# Patient Record
Sex: MALE | Race: OTHER | HISPANIC OR LATINO | URBAN - METROPOLITAN AREA
[De-identification: names, ages, dates, MRNs, and addresses within clinical notes are randomized per-mention and may not be internally consistent; named-entity substitution may affect disease eponyms.]

---

## 2017-03-09 ENCOUNTER — EMERGENCY (EMERGENCY)
Facility: HOSPITAL | Age: 47
LOS: 1 days | Discharge: HOME | End: 2017-03-09
Attending: EMERGENCY MEDICINE | Admitting: EMERGENCY MEDICINE
Payer: MEDICAID

## 2017-03-09 VITALS
TEMPERATURE: 99 F | DIASTOLIC BLOOD PRESSURE: 91 MMHG | HEART RATE: 106 BPM | OXYGEN SATURATION: 100 % | SYSTOLIC BLOOD PRESSURE: 148 MMHG | RESPIRATION RATE: 16 BRPM

## 2017-03-09 DIAGNOSIS — F10.120 ALCOHOL ABUSE WITH INTOXICATION, UNCOMPLICATED: ICD-10-CM

## 2017-03-09 DIAGNOSIS — R41.82 ALTERED MENTAL STATUS, UNSPECIFIED: ICD-10-CM

## 2017-03-09 PROCEDURE — 99283 EMERGENCY DEPT VISIT LOW MDM: CPT

## 2017-03-09 NOTE — ED PROVIDER NOTE - PROGRESS NOTE DETAILS
Patient AAOx3, clear speech, steady gait, appropriate for discharge, counseled extensively on alcohol use and abuse, does not want detox at this time

## 2017-03-09 NOTE — ED PROVIDER NOTE - OBJECTIVE STATEMENT
45 yo M BIB EMS for ETOH intoxication, admits to ETOH today, no other complaints, unable to cooperate with remainder of history 47 yo M BIB EMS for ETOH intoxication, admits to ETOH today, no other complaints, unable to cooperate with remainder of history

## 2017-03-09 NOTE — ED PROVIDER NOTE - NS ED MD SCRIBE ATTENDING SCRIBE SECTIONS
HISTORY OF PRESENT ILLNESS/REVIEW OF SYSTEMS/PHYSICAL EXAM/DISPOSITION/PAST MEDICAL/SURGICAL/SOCIAL HISTORY/OBSERVATION MONITORING PLAN

## 2017-03-09 NOTE — ED PROVIDER NOTE - PHYSICAL EXAMINATION
General: lethargic, arousable to touch, smells of alcohol  Head: NCAT  Eyes: PERRL  Heart: RRR  Lungs: CTAB  Abd: soft, NTND  Neuro: moves all 4 extemities equally

## 2017-03-09 NOTE — ED PROVIDER NOTE - MEDICAL DECISION MAKING DETAILS
Alcohol intoxication, no signs of trauma, not hypoglycemic, neuro exam non-focal, will observe and reassess

## 2017-06-26 ENCOUNTER — EMERGENCY (EMERGENCY)
Facility: HOSPITAL | Age: 47
LOS: 1 days | Discharge: HOME | End: 2017-06-26
Attending: EMERGENCY MEDICINE | Admitting: EMERGENCY MEDICINE
Payer: SELF-PAY

## 2017-06-26 VITALS
TEMPERATURE: 99 F | OXYGEN SATURATION: 98 % | DIASTOLIC BLOOD PRESSURE: 73 MMHG | SYSTOLIC BLOOD PRESSURE: 120 MMHG | RESPIRATION RATE: 16 BRPM | HEART RATE: 89 BPM

## 2017-06-26 DIAGNOSIS — F10.129 ALCOHOL ABUSE WITH INTOXICATION, UNSPECIFIED: ICD-10-CM

## 2017-06-26 DIAGNOSIS — R41.82 ALTERED MENTAL STATUS, UNSPECIFIED: ICD-10-CM

## 2017-06-26 PROCEDURE — 70450 CT HEAD/BRAIN W/O DYE: CPT | Mod: 26

## 2017-06-26 PROCEDURE — 99284 EMERGENCY DEPT VISIT MOD MDM: CPT

## 2017-06-26 RX ORDER — SODIUM CHLORIDE 9 MG/ML
3 INJECTION INTRAMUSCULAR; INTRAVENOUS; SUBCUTANEOUS ONCE
Refills: 0 | Status: DISCONTINUED | OUTPATIENT
Start: 2017-06-26 | End: 2017-06-30

## 2017-06-26 RX ORDER — SODIUM CHLORIDE 9 MG/ML
3 INJECTION INTRAMUSCULAR; INTRAVENOUS; SUBCUTANEOUS ONCE
Refills: 0 | Status: DISCONTINUED | OUTPATIENT
Start: 2017-06-26 | End: 2017-06-26

## 2017-06-26 NOTE — ED PROVIDER NOTE - CONSTITUTIONAL, MLM
normal... Dressed in female clothes. Opens eyes to tactile stimuli, but nonverbal. + ETOH on breath.

## 2017-06-26 NOTE — ED PROVIDER NOTE - PROGRESS NOTE DETAILS
Pt walked out of the ED before CT scan was ready, although ED providers did a wet read with no significant results. Pt refused to leave a telephone number with the ED before leaving.

## 2017-06-26 NOTE — ED PROVIDER NOTE - OBJECTIVE STATEMENT
Pt is a 45 y/o  male to female transgender BIB EMS for AMS. Pt was found unresponsive on the street with a beer can next to her. EMS states the pt was responsive to verbal stimuli Pt is a 47 y/o  male to female transgender BIB EMS for AMS. Pt was found unresponsive on the street with a beer can next to her. EMS states the pt was responsive to verbal stimuli Pt is a 45 y/o  male to female transgender BIB EMS for AMS. Pt was found unresponsive on the street with a beer can next to her. EMS states the pt was responsive to verbal stimuli, currently pt is unresponsive. Limited hx due to unresponsiveness. Pt is a 47 y/o  male to female transgender BIB EMS for AMS. Pt was found unresponsive on the street with a beer can next to her. EMS states the pt was responsive to verbal stimuli, currently pt is unresponsive. Limited hx due to unresponsiveness.

## 2017-06-26 NOTE — ED ADULT NURSE NOTE - OBJECTIVE STATEMENT
Patient is a 45 y/o male BIBA for AMS after intox/substance use. Patient is currently A,A&Ox3 at 12:00pm, walking with steady gait without assistance, and in NAD. Patient states she wants to leave. Patient's skin is intact, neuro intact, +ROM. Will continue to monitor. Patient is a 47 y/o male BIBA for AMS after intox/substance use. Patient is currently A,A&Ox3 at 12:00pm, walking with steady gait without assistance, and in NAD. Patient states she wants to leave. Patient's skin is intact, neuro intact, +ROM. Will continue to monitor.

## 2017-06-26 NOTE — ED PROVIDER NOTE - MEDICAL DECISION MAKING DETAILS
47 y/o M to F, transgender pt presents to ED minimally responsive in the setting of alcohol intoxication.  No overt signs of trauma.  Head CT done.  Pt shortly became agitated and walked out of ED.  CT head read by radiology after discharge- negative. 45 y/o M to F, transgender pt presents to ED minimally responsive in the setting of alcohol intoxication.  No overt signs of trauma.  Head CT done.  Pt shortly became agitated and walked out of ED.  CT head read by radiology after discharge- negative.

## 2017-06-26 NOTE — ED PROVIDER NOTE - ATTENDING CONTRIBUTION TO CARE
BIBEMS for AMS.  wandering around the department making inappropriate sounds and gestures.  CT brain without acute findings.  Discharged from the ED.  Symptoms likely related to substance use - found with beer next to patient in the street.  Safe discharge planning discussed.

## 2017-06-30 ENCOUNTER — EMERGENCY (EMERGENCY)
Facility: HOSPITAL | Age: 47
LOS: 1 days | Discharge: HOME | End: 2017-06-30
Admitting: EMERGENCY MEDICINE
Payer: SELF-PAY

## 2017-06-30 VITALS
TEMPERATURE: 98 F | RESPIRATION RATE: 17 BRPM | DIASTOLIC BLOOD PRESSURE: 82 MMHG | HEART RATE: 90 BPM | OXYGEN SATURATION: 98 % | SYSTOLIC BLOOD PRESSURE: 120 MMHG

## 2017-06-30 DIAGNOSIS — F10.129 ALCOHOL ABUSE WITH INTOXICATION, UNSPECIFIED: ICD-10-CM

## 2017-06-30 DIAGNOSIS — R41.82 ALTERED MENTAL STATUS, UNSPECIFIED: ICD-10-CM

## 2017-06-30 PROCEDURE — 99283 EMERGENCY DEPT VISIT LOW MDM: CPT | Mod: 25

## 2017-06-30 NOTE — ED ADULT NURSE REASSESSMENT NOTE - NS ED NURSE REASSESS COMMENT FT1
patient woke up wailing asking for blanket, provided one. patient woke up wailing asking for blanket, provided one. Pt urinated on self, and been shouting. ED provider at bedside.

## 2017-06-30 NOTE — ED PROVIDER NOTE - OBJECTIVE STATEMENT
45 y/o transgender M to F BIBA for alcohol intoxication after being found intoxicated in public. Pt endorses drinking ETOH tonight and is belligerent and uncooperative with staff. Will not quantify the amount of ETOH ingested. Remainder of Hx limited 2/2 lack of pt cooperation. 47 y/o transgender M to F BIBA for alcohol intoxication after being found intoxicated in public. Pt endorses drinking ETOH tonight and is belligerent and uncooperative with staff. Will not quantify the amount of ETOH ingested. Remainder of Hx limited 2/2 lack of pt cooperation.

## 2017-06-30 NOTE — ED PROVIDER NOTE - ATTENDING CONTRIBUTION TO CARE
Patient seen and examined by me.  Agree with H&P as documented by PA. Pt brought in by EMS after being intoxicated in public.  Pt shouting and belligerent.  Clear speech and steady gait.  Discharged.

## 2017-06-30 NOTE — ED ADULT NURSE NOTE - CHIEF COMPLAINT QUOTE
patient brought in by ambulance from the street after found laying on the side walk. + ETOH breath, unknown if + drugs. + urine incontinence

## 2017-06-30 NOTE — ED ADULT TRIAGE NOTE - CHIEF COMPLAINT QUOTE
patient brought in by ambulance from the street after found laying on the side walk. + ETOH breath, unknown if + drugs

## 2019-01-25 ENCOUNTER — EMERGENCY (EMERGENCY)
Facility: HOSPITAL | Age: 49
LOS: 1 days | Discharge: HOME | End: 2019-01-25
Attending: EMERGENCY MEDICINE | Admitting: EMERGENCY MEDICINE
Payer: COMMERCIAL

## 2019-01-25 VITALS
OXYGEN SATURATION: 96 % | DIASTOLIC BLOOD PRESSURE: 79 MMHG | HEART RATE: 110 BPM | SYSTOLIC BLOOD PRESSURE: 131 MMHG | TEMPERATURE: 98 F | RESPIRATION RATE: 18 BRPM

## 2019-01-25 VITALS
DIASTOLIC BLOOD PRESSURE: 78 MMHG | OXYGEN SATURATION: 98 % | RESPIRATION RATE: 16 BRPM | TEMPERATURE: 98 F | SYSTOLIC BLOOD PRESSURE: 100 MMHG | HEART RATE: 94 BPM

## 2019-01-25 DIAGNOSIS — Z00.8 ENCOUNTER FOR OTHER GENERAL EXAMINATION: ICD-10-CM

## 2019-01-25 DIAGNOSIS — R10.9 UNSPECIFIED ABDOMINAL PAIN: ICD-10-CM

## 2019-01-25 PROCEDURE — 99282 EMERGENCY DEPT VISIT SF MDM: CPT | Mod: 25

## 2019-01-25 PROCEDURE — 99053 MED SERV 10PM-8AM 24 HR FAC: CPT

## 2019-01-25 RX ORDER — SODIUM CHLORIDE 9 MG/ML
1000 INJECTION INTRAMUSCULAR; INTRAVENOUS; SUBCUTANEOUS ONCE
Refills: 0 | Status: DISCONTINUED | OUTPATIENT
Start: 2019-01-25 | End: 2019-01-25

## 2019-01-25 RX ORDER — FAMOTIDINE 10 MG/ML
20 INJECTION INTRAVENOUS ONCE
Refills: 0 | Status: DISCONTINUED | OUTPATIENT
Start: 2019-01-25 | End: 2019-01-25

## 2019-01-25 NOTE — ED PROVIDER NOTE - OBJECTIVE STATEMENT
48 yom pw as EDP, was in a restaruant, had 2 margaritas, refused to leave.  pt initially reported abd pain w/ vomiting, upon my evaluation, feels well but tired, admits to EtOH use, denies pain, denies nausea.

## 2019-01-25 NOTE — ED PROVIDER NOTE - PHYSICAL EXAMINATION
CON: ao x 3, HENMT: clear oropharynx, soft neck, HEAD: atraumatic, CV: rrr, equal pulses b/l, RESP: cta b/l, GI: +BS, soft, nontender, no rebound, no guarding, SKIN: no rash, MSK: no deformities, NEURO: ao x 3, clear speech, follows commands, steady gait

## 2019-01-25 NOTE — ED PROVIDER NOTE - NSFOLLOWUPINSTRUCTIONS_ED_ALL_ED_FT
Follow up with your primary care doctors  Return immediately for any new or worsening symptoms or any new concerns

## 2019-01-25 NOTE — ED ADULT NURSE NOTE - NSIMPLEMENTINTERV_GEN_ALL_ED
Implemented All Universal Safety Interventions:  [place] to call system. Call bell, personal items and telephone within reach. Instruct patient to call for assistance. Room bathroom lighting operational. Non-slip footwear when patient is off stretcher. Physically safe environment: no spills, clutter or unnecessary equipment. Stretcher in lowest position, wheels locked, appropriate side rails in place. Implemented All Universal Safety Interventions:  [place] to call system. Call bell, personal items and telephone within reach. Instruct patient to call for assistance. Room bathroom lighting operational. Non-slip footwear when patient is off stretcher. Physically safe environment: no spills, clutter or unnecessary equipment. Stretcher in lowest position, wheels locked, appropriate side rails in place. Implemented All Universal Safety Interventions:  [place] to call system. Call bell, personal items and telephone within reach. Instruct patient to call for assistance. Room bathroom lighting operational. Non-slip footwear when patient is off stretcher. Physically safe environment: no spills, clutter or unnecessary equipment. Stretcher in lowest position, wheels locked, appropriate side rails in place.

## 2019-01-25 NOTE — ED ADULT TRIAGE NOTE - CHIEF COMPLAINT QUOTE
abdominal pain with vomiting this morning, no fevers, no diarrhea; admits to drinking 2 margaritas PTA

## 2019-01-25 NOTE — ED PROVIDER NOTE - MEDICAL DECISION MAKING DETAILS
denies pain, vomiting resolved, denies being nauseous, will rpt vs, pt declines any lab/iv, nontender abd, clinically not c/w acute intraabd surg pathology, will reassess

## 2019-01-25 NOTE — ED ADULT NURSE REASSESSMENT NOTE - NS ED NURSE REASSESS COMMENT FT1
report received from off-going RN, pt. resting quietly, nad, spoke with Dr. Andrade regarding orders, states do not implement, allow pt. to rest, and will d/c when sober, assessment on-going

## 2019-01-25 NOTE — ED ADULT NURSE NOTE - OBJECTIVE STATEMENT
as per triage note, initially c/o abd. pain, no complaint at present, states drank 2 glasses of wine

## 2019-03-23 ENCOUNTER — EMERGENCY (EMERGENCY)
Facility: HOSPITAL | Age: 49
LOS: 1 days | Discharge: HOME | End: 2019-03-23
Attending: EMERGENCY MEDICINE | Admitting: EMERGENCY MEDICINE
Payer: MEDICAID

## 2019-03-23 VITALS
TEMPERATURE: 98 F | DIASTOLIC BLOOD PRESSURE: 93 MMHG | SYSTOLIC BLOOD PRESSURE: 136 MMHG | RESPIRATION RATE: 18 BRPM | HEART RATE: 103 BPM | OXYGEN SATURATION: 97 %

## 2019-03-23 VITALS
RESPIRATION RATE: 16 BRPM | TEMPERATURE: 99 F | HEART RATE: 89 BPM | DIASTOLIC BLOOD PRESSURE: 86 MMHG | OXYGEN SATURATION: 98 % | SYSTOLIC BLOOD PRESSURE: 128 MMHG

## 2019-03-23 PROCEDURE — 99283 EMERGENCY DEPT VISIT LOW MDM: CPT | Mod: 25

## 2019-03-23 PROCEDURE — 71046 X-RAY EXAM CHEST 2 VIEWS: CPT | Mod: 26

## 2019-03-23 RX ORDER — SPIRONOLACTONE 25 MG/1
25 TABLET, FILM COATED ORAL DAILY
Qty: 0 | Refills: 0 | Status: DISCONTINUED | OUTPATIENT
Start: 2019-03-23 | End: 2019-03-27

## 2019-03-23 RX ORDER — ACETAMINOPHEN 500 MG
975 TABLET ORAL ONCE
Qty: 0 | Refills: 0 | Status: COMPLETED | OUTPATIENT
Start: 2019-03-23 | End: 2019-03-23

## 2019-03-23 RX ORDER — IBUPROFEN 200 MG
600 TABLET ORAL ONCE
Qty: 0 | Refills: 0 | Status: COMPLETED | OUTPATIENT
Start: 2019-03-23 | End: 2019-03-23

## 2019-03-23 RX ADMIN — Medication 600 MILLIGRAM(S): at 10:55

## 2019-03-23 RX ADMIN — SPIRONOLACTONE 25 MILLIGRAM(S): 25 TABLET, FILM COATED ORAL at 07:13

## 2019-03-23 RX ADMIN — Medication 975 MILLIGRAM(S): at 10:55

## 2019-03-23 NOTE — ED ADULT NURSE NOTE - CHPI ED NUR SYMPTOMS NEG
no fever/no pain/no decreased eating/drinking/no dizziness/no chills/no nausea/no tingling/no weakness

## 2019-03-23 NOTE — ED ADULT NURSE REASSESSMENT NOTE - NS ED NURSE REASSESS COMMENT FT1
received pt from night RN. Pt a&ox3, calm and resting. pt admits to be intoxicated and drinking. open Cans of norman's found at pts bedside. all alcohol has been removed from pt. Pt denies any chest pain, sob, n/v/d, weakness. Pt states she is just tired. Pt in hallway, close to nursing station for monitoring.

## 2019-03-23 NOTE — ED PROVIDER NOTE - SHIFT CHANGE DETAILS
I have signed over this patient to the above attending physician. Pertinent history, physical exam findings and workup thus far in the ED have been discussed. The pending tests and plan, including SW evaluation were signed over.  All questions from the above attending physician have been answered.

## 2019-03-23 NOTE — ED PROVIDER NOTE - NSFOLLOWUPINSTRUCTIONS_ED_ALL_ED_FT
You were given shelter and outpatient resources information after speaking with the emergency department . Stay hydrated by drinking plenty of water. Follow up with a primary care doctor. Return to the emergency department promptly for any urgent concerns.

## 2019-03-23 NOTE — PROVIDER CONTACT NOTE (OTHER) - BACKGROUND
Medical team referred this case as pt has been medically cleared for discharge. Pt is 48 F on hormone therapy, long term, no recent changes, presents with HX of depression, reports has recently lost

## 2019-03-23 NOTE — ED ADULT NURSE NOTE - OBJECTIVE STATEMENT
Patient received in room 16  alert and oriented x 4, ambulatory, respirations are even and unlabored, S1, S2 regular, abdomen is soft and nontender, she denies any suicidal or homicidal ideations. No IV access is placed at this time. Safety measures maintained. Will follow up.

## 2019-03-23 NOTE — ED PROVIDER NOTE - PROGRESS NOTE DETAILS
Tara: Patient signed out to me from Dr. Quick.  Currently awaiting social work for further eval.  Currently resting comfortably with no active complaints.

## 2019-03-23 NOTE — PROVIDER CONTACT NOTE (OTHER) - RECOMMENDATIONS
Medical team was made aware and in agreement. All questions or concerns were addressed with Pt to satisfaction, No further SW intervention needed at this time for this visit.

## 2019-03-23 NOTE — ED PROVIDER NOTE - OBJECTIVE STATEMENT
48M->F on hormone therapy, long term, no recent changes, presents with a cc of depression, reports has recently lost home was kicked out of shelter, endorses drinking more etoh than normal, unable to quantify how much or what kind of etoh - reports is having difficulty finding a new living situation, feels unsafe with currently living situation, no prior hx of etoh withdraw last drink tonight prior to ED arrival, denies rec substance use, endorses cough dry persisting, denies SI/HI. Denies n/v/f/c/cp/sob. Denies headache, syncope, lightheadedness, dizziness. Denies chest palpitations, abdominal pain.    H&P w/ N.Jagdish DO

## 2019-03-23 NOTE — ED ADULT TRIAGE NOTE - CHIEF COMPLAINT QUOTE
DALTON ROBLERO    Pt is a transgender woman... .transitioning from male to female... reports was castrated and on hormone therapy... states feeling "retarded" and "confused". admits to drinking "a lot" but denies any other drugs.  denies hallucinations or suicidal/homicidal ideation ... pt tearful... emotional at times ... says she wants to speak to doctor about her hormone therapy.  prefers to be addressed at "[place]"...

## 2019-03-23 NOTE — ED ADULT NURSE NOTE - CHIEF COMPLAINT QUOTE
DALTON ROBLERO    Pt is a transgender woman... .transitioning from male to female... reports was castrated and on hormone therapy... states feeling "retarded" and "confused". admits to drinking "a lot" but denies any other drugs.  denies hallucinations or suicidal/homicidal ideation ... pt tearful... emotional at times ... says she wants to speak to doctor about her hormone therapy.  prefers to be addressed at "[place]"...

## 2019-03-23 NOTE — PROVIDER CONTACT NOTE (OTHER) - ASSESSMENT
home was kicked out of shelter, endorses drinking more etoh than normal, unable to quantify how much or what kind of etoh Pt stated “ I have problem of drinking and wants shelter information “.pt stated “ I have no place to go and needs shelter in formation”. Pt was educated about importance of going to shelter. Writer educated the pt about [facility]’s assessment shelter at both [facility]. Writer informed the pt the shelter information and metro card will be given to the pt.Pt was also educated and encouraged the pt go [facility]’s [facility] for safety concern and her own wellbeing. Pt is alert, ambulatory and oriented X4. Pt denies suicidal or homicidal thoughts during this time of discharge. Writer provided with education on ill effect on alcohol use, information about [facility], Rehab was discussed with the pt and pt declined to go. However Pt took the list of programs on inpatient and outpatient centers. Writer provided the pt with a metro card # [phone].

## 2019-03-23 NOTE — ED PROVIDER NOTE - ATTENDING CONTRIBUTION TO CARE
Abdelrahman: 47 yo male to female transgender patient requesting SW help finding a new place to live.   pt was asked to leave the [facility] where she was previously staying. No other acute complaints. Pt also c/o nonproductive cough over several months. No Le pain or edema. Exam: GENERAL: well appearing, NAD, HEENT: MMM, PERRLA, CARDIO: +S1/S2, no murmurs, rubs or gallops, LUNGS: CTA B/L, no wheezing, rales or rhonchi, ABD: soft, nontender, BSx4 quadrants, no guarding or rigidity. EXT: No LE edema or calf TTP, NEURO: AxOx3, SKIN: no rashes or lesions, well perfused A/P- 48 you female transgender patient with non productive cough. A/P- 47 yo female with cough. will obtain CXR and consult OB/GYN. Abdelrahman: 49 yo male to female transgender patient requesting SW help finding a new place to live.   pt was asked to leave the [facility] where she was previously staying. No other acute complaints. Pt also c/o nonproductive cough over several months. No Le pain or edema. Exam: GENERAL: well appearing, NAD, HEENT: MMM, PERRLA, CARDIO: +S1/S2, no murmurs, rubs or gallops, LUNGS: CTA B/L, no wheezing, rales or rhonchi, ABD: soft, nontender, BSx4 quadrants, no guarding or rigidity. EXT: No LE edema or calf TTP, NEURO: AxOx3, SKIN: no rashes or lesions, well perfused A/P- 48 you female transgender patient with non productive cough. A/P- 49 yo female with cough. will obtain CXR and consult SW. Pt also requesting her usual dose of spironolactone.

## 2019-03-23 NOTE — ED PROVIDER NOTE - CLINICAL SUMMARY MEDICAL DECISION MAKING FREE TEXT BOX
Raeann PGY2: 48M->F on hormone therapy, long term, no recent changes, presents with a cc of depression, reports has recently lost home was kicked out of shelter, endorses drinking more etoh than normal, unable to quantify how much or what kind of etoh - reports is having difficulty finding a new living situation, no prior hx of etoh withdraw last drink tonight prior to ED arrival, denies rec substance use, endorses cough dry persisting, denies SI/HI exam vss well appearing non toxic exam non focal low c/f acute life threatening illness will eval for pna cxr, sw consult, appears clinically sober without signs c/f withdraw,  reassess

## 2019-03-24 ENCOUNTER — EMERGENCY (EMERGENCY)
Facility: HOSPITAL | Age: 49
LOS: 1 days | Discharge: HOME | End: 2019-03-24
Attending: EMERGENCY MEDICINE | Admitting: EMERGENCY MEDICINE
Payer: MEDICAID

## 2019-03-24 VITALS
HEART RATE: 71 BPM | DIASTOLIC BLOOD PRESSURE: 87 MMHG | RESPIRATION RATE: 18 BRPM | TEMPERATURE: 98 F | SYSTOLIC BLOOD PRESSURE: 125 MMHG | OXYGEN SATURATION: 97 %

## 2019-03-24 VITALS
TEMPERATURE: 98 F | RESPIRATION RATE: 19 BRPM | SYSTOLIC BLOOD PRESSURE: 116 MMHG | DIASTOLIC BLOOD PRESSURE: 74 MMHG | HEART RATE: 89 BPM | OXYGEN SATURATION: 96 %

## 2019-03-24 DIAGNOSIS — F10.120 ALCOHOL ABUSE WITH INTOXICATION, UNCOMPLICATED: ICD-10-CM

## 2019-03-24 DIAGNOSIS — R41.82 ALTERED MENTAL STATUS, UNSPECIFIED: ICD-10-CM

## 2019-03-24 PROCEDURE — 99283 EMERGENCY DEPT VISIT LOW MDM: CPT | Mod: 25

## 2019-03-24 NOTE — ED ADULT NURSE NOTE - NSIMPLEMENTINTERV_GEN_ALL_ED
Implemented All Fall Risk Interventions:  [place] to call system. Call bell, personal items and telephone within reach. Instruct patient to call for assistance. Room bathroom lighting operational. Non-slip footwear when patient is off stretcher. Physically safe environment: no spills, clutter or unnecessary equipment. Stretcher in lowest position, wheels locked, appropriate side rails in place. Provide visual cue, wrist band, yellow gown, etc. Monitor gait and stability. Monitor for mental status changes and reorient to person, place, and time. Review medications for side effects contributing to fall risk. Reinforce activity limits and safety measures with patient and family. Implemented All Fall Risk Interventions:  [place] to call system. Call bell, personal items and telephone within reach. Instruct patient to call for assistance. Room bathroom lighting operational. Non-slip footwear when patient is off stretcher. Physically safe environment: no spills, clutter or unnecessary equipment. Stretcher in lowest position, wheels locked, appropriate side rails in place. Provide visual cue, wrist band, yellow gown, etc. Monitor gait and stability. Monitor for mental status changes and reorient to person, place, and time. Review medications for side effects contributing to fall risk. Reinforce activity limits and safety measures with patient and family. Implemented All Fall Risk Interventions:  [place] to call system. Call bell, personal items and telephone within reach. Instruct patient to call for assistance. Room bathroom lighting operational. Non-slip footwear when patient is off stretcher. Physically safe environment: no spills, clutter or unnecessary equipment. Stretcher in lowest position, wheels locked, appropriate side rails in place. Provide visual cue, wrist band, yellow gown, etc. Monitor gait and stability. Monitor for mental status changes and reorient to person, place, and time. Review medications for side effects contributing to fall risk. Reinforce activity limits and safety measures with patient and family.

## 2019-03-24 NOTE — ED PROVIDER NOTE - NS ED MD EM SELECTION
40567 Exp Problem Focused - Mod. Complex 55480 Exp Problem Focused - Mod. Complex 17542 Exp Problem Focused - Mod. Complex

## 2019-03-24 NOTE — ED PROVIDER NOTE - CLINICAL SUMMARY MEDICAL DECISION MAKING FREE TEXT BOX
clinically sober w/out active medical complaints, ambulatory w/out assistance. d/c home with return precautions.

## 2019-03-24 NOTE — ED PROVIDER NOTE - OBJECTIVE STATEMENT
47 y/o trans-woman M2F, prefers to be called Reta, BIBEMS for public intoxication, admits to drinking alcohol, denies coingestion, denies pain of any kind or traumatic injury. 49 y/o trans-woman M2F, prefers to be called Reta, BIBEMS for public intoxication, admits to drinking alcohol, denies coingestion, denies pain of any kind or traumatic injury.

## 2019-03-24 NOTE — ED PROVIDER NOTE - PHYSICAL EXAMINATION
VITAL SIGNS: I have reviewed nursing notes and confirm.  CONSTITUTIONAL: Well-developed; well-nourished M2F transwoman lying calmly in stretcher sleeping smelling of alcohol, wakes easily, unlabored breathing following commands appropriately; in no acute distress.  SKIN: Agree with RN documentation regarding decubitus evaluation. Remainder of skin exam is warm and dry, no acute rash.  HEAD: Normocephalic; atraumatic.  EYES: PERRL, EOM intact; conjunctiva and sclera clear.  ENT: No nasal discharge; airway clear.  NECK: Supple; non tender.  CARD: S1, S2 normal; no murmurs, gallops, or rubs. Regular rate and rhythm.  RESP: No wheezes, rales or rhonchi.  ABD: Normal bowel sounds; soft; non-distended; non-tender  EXT: Normal ROM. No clubbing, cyanosis or edema.  LYMPH: No acute cervical adenopathy.  NEURO: Alert, oriented. Grossly unremarkable.  PSYCH: Cooperative, intoxicated

## 2019-03-24 NOTE — ED ADULT NURSE NOTE - OBJECTIVE STATEMENT
49 y/o BIBA for EMS. pt awake and admits to ETOH use. pt denies injuries or falls. 47 y/o BIBA for EMS. pt awake and admits to ETOH use. pt denies injuries or falls.

## 2019-03-24 NOTE — ED PROVIDER NOTE - NSFOLLOWUPINSTRUCTIONS_ED_ALL_ED_FT
Log Out.    [employer] CareNotes®     :  [facility]             ALCOHOL USE DISORDER - AfterCare(R) Instructions(ER/ED)     Alcohol Use Disorder    WHAT YOU NEED TO KNOW:    Alcohol use disorder (AUD) is problem drinking. AUD includes alcohol abuse and alcohol dependence. Alcohol can damage your brain, heart, kidneys, lungs, and liver. Your risk of stroke is greater if you have 5 or more drinks each day. If you are pregnant, you and your baby are at risk for serious health problems. No amount of alcohol is safe during pregnancy.    DISCHARGE INSTRUCTIONS:    Call your local emergency number (911 in the [place]) if:     You have seizures.        Call your doctor if:     Your heart is beating faster than usual.      You have hallucinations.      You cannot remember what happens while you are drinking.      You are anxious and have nausea.      Your hands are shaky and you are sweating heavily.      You have questions or concerns about your condition or care.    Follow up with your healthcare provider as directed: Do not try to stop drinking on your own. Your healthcare provider may need to help you withdraw from alcohol safely. He or she may need to admit you to the hospital. You may also need any of the following:    Medicines to decrease your craving for alcohol      Support groups such as Alcoholics Anonymous       Therapy from a psychiatrist or psychologist       Admission to an inpatient facility for treatment for severe AUD    For support and more information:     Substance Abuse and Mental Health Services Administration  [address]  [place] [address]  Web Address: http://www.samhsa.gov      Alcoholics Anonymous  Web Address: http://www.aa.org Log Out.    Register My InfoÂ® CareNotes®     :  [facility]             ALCOHOL USE DISORDER - AfterCare(R) Instructions(ER/ED)     Alcohol Use Disorder    WHAT YOU NEED TO KNOW:    Alcohol use disorder (AUD) is problem drinking. AUD includes alcohol abuse and alcohol dependence. Alcohol can damage your brain, heart, kidneys, lungs, and liver. Your risk of stroke is greater if you have 5 or more drinks each day. If you are pregnant, you and your baby are at risk for serious health problems. No amount of alcohol is safe during pregnancy.    DISCHARGE INSTRUCTIONS:    Call your local emergency number (911 in the [place]) if:     You have seizures.        Call your doctor if:     Your heart is beating faster than usual.      You have hallucinations.      You cannot remember what happens while you are drinking.      You are anxious and have nausea.      Your hands are shaky and you are sweating heavily.      You have questions or concerns about your condition or care.    Follow up with your healthcare provider as directed: Do not try to stop drinking on your own. Your healthcare provider may need to help you withdraw from alcohol safely. He or she may need to admit you to the hospital. You may also need any of the following:    Medicines to decrease your craving for alcohol      Support groups such as Alcoholics Anonymous       Therapy from a psychiatrist or psychologist       Admission to an inpatient facility for treatment for severe AUD    For support and more information:     Substance Abuse and Mental Health Services Administration  [address]  [place] [address]  Web Address: http://www.samhsa.gov      Alcoholics Anonymous  Web Address: http://www.aa.org Log Out.    [address] CareNotes®     :  [facility]             ALCOHOL USE DISORDER - AfterCare(R) Instructions(ER/ED)     Alcohol Use Disorder    WHAT YOU NEED TO KNOW:    Alcohol use disorder (AUD) is problem drinking. AUD includes alcohol abuse and alcohol dependence. Alcohol can damage your brain, heart, kidneys, lungs, and liver. Your risk of stroke is greater if you have 5 or more drinks each day. If you are pregnant, you and your baby are at risk for serious health problems. No amount of alcohol is safe during pregnancy.    DISCHARGE INSTRUCTIONS:    Call your local emergency number (911 in the [place]) if:     You have seizures.        Call your doctor if:     Your heart is beating faster than usual.      You have hallucinations.      You cannot remember what happens while you are drinking.      You are anxious and have nausea.      Your hands are shaky and you are sweating heavily.      You have questions or concerns about your condition or care.    Follow up with your healthcare provider as directed: Do not try to stop drinking on your own. Your healthcare provider may need to help you withdraw from alcohol safely. He or she may need to admit you to the hospital. You may also need any of the following:    Medicines to decrease your craving for alcohol      Support groups such as Alcoholics Anonymous       Therapy from a psychiatrist or psychologist       Admission to an inpatient facility for treatment for severe AUD    For support and more information:     Substance Abuse and Mental Health Services Administration  [address]  [place] [address]  Web Address: http://www.samhsa.gov      Alcoholics Anonymous  Web Address: http://www.aa.org

## 2019-03-24 NOTE — ED ADULT NURSE REASSESSMENT NOTE - NS ED NURSE REASSESS COMMENT FT1
pt requesting to shower- provided baby wipes to clean up- pt wants to rest in stretcher. pt ambulating with steady gait.

## 2019-03-25 ENCOUNTER — EMERGENCY (EMERGENCY)
Facility: HOSPITAL | Age: 49
LOS: 1 days | Discharge: LEFT BEFORE DISCHARGE | End: 2019-03-25
Admitting: EMERGENCY MEDICINE

## 2019-03-25 VITALS
TEMPERATURE: 98 F | DIASTOLIC BLOOD PRESSURE: 80 MMHG | OXYGEN SATURATION: 100 % | RESPIRATION RATE: 18 BRPM | HEART RATE: 105 BPM | SYSTOLIC BLOOD PRESSURE: 131 MMHG

## 2019-03-25 DIAGNOSIS — R41.82 ALTERED MENTAL STATUS, UNSPECIFIED: ICD-10-CM

## 2019-03-25 NOTE — ED ADULT NURSE NOTE - NSIMPLEMENTINTERV_GEN_ALL_ED
Implemented All Universal Safety Interventions:  [place] to call system. Call bell, personal items and telephone within reach. Instruct patient to call for assistance. Room bathroom lighting operational. Non-slip footwear when patient is off stretcher. Physically safe environment: no spills, clutter or unnecessary equipment. Stretcher in lowest position, wheels locked, appropriate side rails in place. Implemented All Universal Safety Interventions:  [place] to call system. Call bell, personal items and telephone within reach. Instruct patient to call for assistance. Room bathroom lighting operational. Non-slip footwear when patient is off stretcher. Physically safe environment: no spills, clutter or unnecessary equipment. Stretcher in lowest position, wheels locked, appropriate side rails in place. Implemented All Universal Safety Interventions:  [place] to call system. Call bell, personal items and telephone within reach. Instruct patient to call for assistance. Room bathroom lighting operational. Non-slip footwear when patient is off stretcher. Physically safe environment: no spills, clutter or unnecessary equipment. Stretcher in lowest position, wheels locked, appropriate side rails in place.

## 2019-03-27 PROBLEM — F64.0 TRANSSEXUALISM: Chronic | Status: ACTIVE | Noted: 2019-03-23

## 2019-04-27 ENCOUNTER — EMERGENCY (EMERGENCY)
Facility: HOSPITAL | Age: 49
LOS: 1 days | Discharge: HOME | End: 2019-04-27
Attending: EMERGENCY MEDICINE
Payer: MEDICAID

## 2019-04-27 VITALS
DIASTOLIC BLOOD PRESSURE: 70 MMHG | SYSTOLIC BLOOD PRESSURE: 114 MMHG | HEART RATE: 88 BPM | RESPIRATION RATE: 18 BRPM | TEMPERATURE: 98 F | OXYGEN SATURATION: 100 %

## 2019-04-27 VITALS
SYSTOLIC BLOOD PRESSURE: 137 MMHG | TEMPERATURE: 98 F | RESPIRATION RATE: 16 BRPM | HEART RATE: 100 BPM | OXYGEN SATURATION: 100 % | DIASTOLIC BLOOD PRESSURE: 91 MMHG

## 2019-04-27 PROCEDURE — 99285 EMERGENCY DEPT VISIT HI MDM: CPT

## 2019-04-27 PROCEDURE — 99285 EMERGENCY DEPT VISIT HI MDM: CPT | Mod: 25

## 2019-04-27 PROCEDURE — 82962 GLUCOSE BLOOD TEST: CPT

## 2019-04-27 NOTE — ED ADULT NURSE NOTE - NSIMPLEMENTINTERV_GEN_ALL_ED
Implemented All Fall Risk Interventions:  [place] to call system. Call bell, personal items and telephone within reach. Instruct patient to call for assistance. Room bathroom lighting operational. Non-slip footwear when patient is off stretcher. Physically safe environment: no spills, clutter or unnecessary equipment. Stretcher in lowest position, wheels locked, appropriate side rails in place. Provide visual cue, wrist band, yellow gown, etc. Monitor gait and stability. Monitor for mental status changes and reorient to person, place, and time. Review medications for side effects contributing to fall risk. Reinforce activity limits and safety measures with patient and family.

## 2019-04-27 NOTE — ED PROVIDER NOTE - NS ED NOTE AC HIGH RISK COUNTRIES
Former Dr Armijo Pt last seen 12/6,NOS with you 2/21 and appt 4/11.Pharmacy requesting refill seroquel 25 mg q pm.Is it ok to refill?   No

## 2019-04-27 NOTE — ED PROVIDER NOTE - PROGRESS NOTE DETAILS
Pt more sober than upon arrival, but still falling asleep while talking to her. Will sign out to day team to fu reassessment and final dispo. pt endorsed to me at sign out. now clinically sober - pt states she is here bc she was intox, nl gait. ate lunch. wants to go -Daniel Becker MD-

## 2019-04-27 NOTE — ED PROVIDER NOTE - OBJECTIVE STATEMENT
49 yo MTF transgender pt (preferred name: Reta) presents by EMS for etoh intox. Pt asking if  hospital has detox. Denies acute complaints.

## 2019-04-27 NOTE — ED PROVIDER NOTE - NSFOLLOWUPINSTRUCTIONS_ED_ALL_ED_FT
Please follow up with your personal medical doctor in 24-48 hours.   Consider to cut back on your drinking.  Bring results from today to your visit.  If your symptoms change, get worse or if you have any new symptoms, come to the ER right away.  If you have any questions, call the ER at the phone number on this page.

## 2019-04-27 NOTE — ED PROVIDER NOTE - PHYSICAL EXAMINATION
GENERAL: wells appearing, no acute distress, etoh on breath, falling asleep during exam   HEAD: atraumatic   EYES: EOMI, pink conjunctiva   ENT: moist oral mucosa   CARDIAC: RRR, no edema, distal pulses present   RESPIRATORY: lungs CTAB, no increased work of breathing   GASTROINTESTINAL: no abdominal tenderness, no rebound or guarding, bowel sounds presents  GENITOURINARY: no CVA tenderness   MUSCULOSKELETAL: no deformity   NEUROLOGICAL: AAOx3, CN's II-XII intact, strength 5/5 bilateral UE and LE, sensation intact to light touch  SKIN: intact   PSYCHIATRIC: cooperative  HEME LYMPH: no lymphadenopathy

## 2019-04-29 ENCOUNTER — EMERGENCY (EMERGENCY)
Facility: HOSPITAL | Age: 49
LOS: 1 days | Discharge: HOME | End: 2019-04-29
Attending: EMERGENCY MEDICINE | Admitting: EMERGENCY MEDICINE
Payer: MEDICAID

## 2019-04-29 VITALS
SYSTOLIC BLOOD PRESSURE: 100 MMHG | TEMPERATURE: 98 F | RESPIRATION RATE: 16 BRPM | OXYGEN SATURATION: 100 % | DIASTOLIC BLOOD PRESSURE: 62 MMHG | HEART RATE: 87 BPM

## 2019-04-29 VITALS
HEART RATE: 97 BPM | SYSTOLIC BLOOD PRESSURE: 145 MMHG | OXYGEN SATURATION: 100 % | DIASTOLIC BLOOD PRESSURE: 104 MMHG | RESPIRATION RATE: 18 BRPM | TEMPERATURE: 98 F

## 2019-04-29 DIAGNOSIS — R41.82 ALTERED MENTAL STATUS, UNSPECIFIED: ICD-10-CM

## 2019-04-29 DIAGNOSIS — F10.129 ALCOHOL ABUSE WITH INTOXICATION, UNSPECIFIED: ICD-10-CM

## 2019-04-29 PROCEDURE — 99284 EMERGENCY DEPT VISIT MOD MDM: CPT | Mod: 25

## 2019-04-29 NOTE — ED PROVIDER NOTE - OBJECTIVE STATEMENT
Pt BIB by EMS for ETOH intoxication.  Admits to heavy ETOH use today, no other complaints.  Placed in stretcher and quickly falls asleep.  Unable to cooperate with remainder of history due to clinical condition/AMS.

## 2019-04-29 NOTE — ED PROVIDER NOTE - NSFOLLOWUPCLINICS_GEN_ALL_ED_FT
[facility]  Family Medicine  [facility]. [address]  [place], [place] [facility]  Phone: [phone]  Fax:   Follow Up Time: [facility]  Family Medicine  [facility]. [address]  [place], [place] [facility]  Phone: [phone]  Fax:   Follow Up Time: [facility]  Family Medicine  [facility]. [address]  [place], [place] [facility]  Phone: [phone]  Fax:   Follow Up Time:

## 2019-04-29 NOTE — ED PROVIDER NOTE - NSFOLLOWUPINSTRUCTIONS_ED_ALL_ED_FT
-PLEASE FOLLOW-UP WITH YOUR PRIMARY CARE DOCTOR IN 1-2 DAYS.  BRING ALL PAPERWORK FROM TODAY'S VISIT TO YOUR FOLLOW-UP VISIT.  IF YOU DO NOT HAVE A PRIMARY CARE DOCTOR PLEASE REFER TO THE OFFICE/CLINIC INFORMATION GIVEN ABOVE.  YOU MAY ALSO CALL [phone] AND ASK FOR MS. SHELLEY SEARS.  SHE CAN HELP YOU MAKE A FOLLOW-UP APPOINTMENT.  HER HOURS ARE 11AM-7PM MONDAY - FRIDAY.    -PLEASE RETURN TO THE ER IMMEDIATELY OR CALL 911 FOR ANY HIGH FEVER, TROUBLE BREATHING, VOMITING, SEVERE PAIN, OR ANY OTHER CONCERNS. -PLEASE FOLLOW-UP WITH YOUR PRIMARY CARE DOCTOR IN 1-2 DAYS.  BRING ALL PAPERWORK FROM TODAY'S VISIT TO YOUR FOLLOW-UP VISIT.  IF YOU DO NOT HAVE A PRIMARY CARE DOCTOR PLEASE REFER TO THE OFFICE/CLINIC INFORMATION GIVEN ABOVE.  YOU MAY ALSO CALL [phone] AND ASK FOR MS. SHELLEY SEARS.  SHE CAN HELP YOU MAKE A FOLLOW-UP APPOINTMENT.  HER HOURS ARE 11AM-7PM MONDAY - FRIDAY.    -PLEASE RETURN TO THE ER IMMEDIATELY OR CALL 911 FOR ANY HIGH FEVER, TROUBLE BREATHING, VOMITING, SEVERE PAIN, OR ANY OTHER CONCERNS. -PLEASE FOLLOW-UP WITH YOUR PRIMARY CARE DOCTOR IN 1-2 DAYS.  BRING ALL PAPERWORK FROM TODAY'S VISIT TO YOUR FOLLOW-UP VISIT.  IF YOU DO NOT HAVE A PRIMARY CARE DOCTOR PLEASE REFER TO THE OFFICE/CLINIC INFORMATION GIVEN ABOVE.  YOU MAY ALSO CALL [phone] AND ASK FOR MS. SHELLEY SEARS.  SHE CAN HELP YOU MAKE A FOLLOW-UP APPOINTMENT.  HER HOURS ARE 11AM-7PM MONDAY - FRIDAY.    -PLEASE RETURN TO THE ER IMMEDIATELY OR CALL 911 FOR ANY HIGH FEVER, TROUBLE BREATHING, VOMITING, SEVERE PAIN, OR ANY OTHER CONCERNS.

## 2019-04-29 NOTE — ED PROVIDER NOTE - PHYSICAL EXAMINATION
General: lethargic, arousable to touch, smells of alcohol  Head: NCAT  Eyes: PERRL  ENT: Airway clear  Heart: RRR  Lungs: CTAB  Abd: soft, NTND  Neuro: moves all 4 extremities equally  Skin: no e/o lacerations, abrasions, or ecchymoses

## 2019-04-29 NOTE — ED PROVIDER NOTE - PROGRESS NOTE DETAILS
The patient is now awake and alert, clinically sober.  Able to walk a straight line.  Repeat exam and neuro/cranial nerve exams normal.  No evidence of head/neck trauma.  Patient denies any pain or other complaints.  Denies cp/sob/ha/abd pain.  Abd soft, lungs clear, heart exam normal.  Ahsan po challenge.  Patient says only used alcohol no other substances.  Denies any assault.  Feels much better and pt feels safe for discharge.  No evidence of intoxication at this time or alcohol withdrawal.  No other complaints on discharge.

## 2019-04-29 NOTE — ED ADULT NURSE NOTE - NSIMPLEMENTINTERV_GEN_ALL_ED
Implemented All Universal Safety Interventions:  [place] to call system. Call bell, personal items and telephone within reach. Instruct patient to call for assistance. Room bathroom lighting operational. Non-slip footwear when patient is off stretcher. Physically safe environment: no spills, clutter or unnecessary equipment. Stretcher in lowest position, wheels locked, appropriate side rails in place. Implemented All Universal Safety Interventions:  [place] to call system. Call bell, personal items and telephone within reach. Instruct patient to call for assistance. Room bathroom lighting operational. Non-slip footwear when patient is off stretcher. Physically safe environment: no spills, clutter or unnecessary equipment. Stretcher in lowest position, wheels locked, appropriate side rails in place. Implemented All Universal Safety Interventions:  [place] to call system. Call bell, personal items and telephone within reach. Instruct patient to call for assistance. Room bathroom lighting operational. Non-slip footwear when patient is off stretcher. Physically safe environment: no spills, clutter or unnecessary equipment. Stretcher in lowest position, wheels locked, appropriate side rails in place.

## 2019-04-29 NOTE — ED ADULT NURSE REASSESSMENT NOTE - NS ED NURSE REASSESS COMMENT FT1
Pt sleeping in bed at this time with no sign of acute distress at this time. Pt stable, safety maintained, will continue to monitor.

## 2019-04-30 ENCOUNTER — EMERGENCY (EMERGENCY)
Facility: HOSPITAL | Age: 49
LOS: 1 days | Discharge: HOME | End: 2019-04-30
Admitting: EMERGENCY MEDICINE
Payer: MEDICAID

## 2019-04-30 VITALS
HEART RATE: 103 BPM | TEMPERATURE: 98 F | SYSTOLIC BLOOD PRESSURE: 137 MMHG | RESPIRATION RATE: 18 BRPM | OXYGEN SATURATION: 98 % | DIASTOLIC BLOOD PRESSURE: 93 MMHG

## 2019-04-30 DIAGNOSIS — R41.82 ALTERED MENTAL STATUS, UNSPECIFIED: ICD-10-CM

## 2019-04-30 DIAGNOSIS — F10.129 ALCOHOL ABUSE WITH INTOXICATION, UNSPECIFIED: ICD-10-CM

## 2019-04-30 PROCEDURE — 99284 EMERGENCY DEPT VISIT MOD MDM: CPT | Mod: 25

## 2019-04-30 NOTE — ED PROVIDER NOTE - CLINICAL SUMMARY MEDICAL DECISION MAKING FREE TEXT BOX
49 y/o M presents to ED with alcohol intoxication. VSS. NAD.  Pt alert, oriented, cooperative and ambulates with steady gait.  VSS.  Pt offered list of detox facilities and discharged. 47 y/o M presents to ED with alcohol intoxication. VSS. NAD.  Pt alert, oriented, cooperative and ambulates with steady gait.  VSS.  Pt offered list of detox facilities and discharged.

## 2019-04-30 NOTE — ED ADULT NURSE NOTE - OBJECTIVE STATEMENT
47 y/o  M who comes into the ed c/o AMS. pt admits to drinking ETOH tonight, denies injuries or falls. pt ambulating with steady gait at this time. no injury noted.

## 2019-04-30 NOTE — ED PROVIDER NOTE - OBJECTIVE STATEMENT
49 y/o M to F transgender pt.  She presents to ED c/o via EMS for alcohol intoxication.  Pt picked from [facility].  She arrives alert, oriented, cooperative and ambulatory.  Pt requesting detox. 49 y/o M to F transgender pt.  She presents to ED c/o via EMS for alcohol intoxication.  Pt picked from [facility].  She arrives alert, oriented, cooperative and ambulatory.  Pt requesting detox. 49 y/o M to F transgender pt.  She presents to ED c/o via EMS for alcohol intoxication.  Pt picked from [facility].  She arrives alert, oriented, cooperative and ambulatory.  Pt requesting detox.

## 2019-04-30 NOTE — ED ADULT NURSE NOTE - CHIEF COMPLAINT QUOTE
BIBA for AMS, found sleeping in a deli. pt ambulating with steady gait, reports headache and nausea. denies falls or injuries. pt admits to drinking "too many margaritas"

## 2019-04-30 NOTE — ED ADULT NURSE NOTE - NSIMPLEMENTINTERV_GEN_ALL_ED
Implemented All Universal Safety Interventions:  [place] to call system. Call bell, personal items and telephone within reach. Instruct patient to call for assistance. Room bathroom lighting operational. Non-slip footwear when patient is off stretcher. Physically safe environment: no spills, clutter or unnecessary equipment. Stretcher in lowest position, wheels locked, appropriate side rails in place. Implemented All Universal Safety Interventions:  [place] to call system. Call bell, personal items and telephone within reach. Instruct patient to call for assistance. Room bathroom lighting operational. Non-slip footwear when patient is off stretcher. Physically safe environment: no spills, clutter or unnecessary equipment. Stretcher in lowest position, wheels locked, appropriate side rails in place. Implemented All Universal Safety Interventions:  [place] to call system. Call bell, personal items and telephone within reach. Instruct patient to call for assistance. Room bathroom lighting operational. Non-slip footwear when patient is off stretcher. Physically safe environment: no spills, clutter or unnecessary equipment. Stretcher in lowest position, wheels locked, appropriate side rails in place.

## 2019-04-30 NOTE — ED ADULT TRIAGE NOTE - CHIEF COMPLAINT QUOTE
BIBA for AMS, found sleeping in a deli. pt ambulating with steady gait, reports headache and nausea. denies falls or injuries. pt admits to drinking "too many margaritas"
30

## 2019-06-26 ENCOUNTER — EMERGENCY (EMERGENCY)
Facility: HOSPITAL | Age: 49
LOS: 1 days | Discharge: HOME | End: 2019-06-26
Admitting: EMERGENCY MEDICINE
Payer: MEDICAID

## 2019-06-26 VITALS
HEART RATE: 99 BPM | OXYGEN SATURATION: 95 % | SYSTOLIC BLOOD PRESSURE: 109 MMHG | RESPIRATION RATE: 18 BRPM | TEMPERATURE: 97 F | DIASTOLIC BLOOD PRESSURE: 72 MMHG

## 2019-06-26 PROCEDURE — 99284 EMERGENCY DEPT VISIT MOD MDM: CPT

## 2019-06-26 NOTE — ED ADULT TRIAGE NOTE - CHIEF COMPLAINT QUOTE
lynda with complaints of etoh intox possible drug use. arrives awake alert and responsive as per ems patient approached ambulance saying she wanted to sleep.

## 2019-06-27 VITALS
TEMPERATURE: 98 F | SYSTOLIC BLOOD PRESSURE: 104 MMHG | HEART RATE: 89 BPM | DIASTOLIC BLOOD PRESSURE: 65 MMHG | RESPIRATION RATE: 18 BRPM | OXYGEN SATURATION: 96 %

## 2019-06-27 NOTE — ED PROVIDER NOTE - OBJECTIVE STATEMENT
Pt BIBA for public EtOH intox. Admits to having heavy EtOH intake today.  Denies drug use or other illicits. No acute medical complaints or apparent trauma noted.  Minimally cooperative with hx and ROS due to heavy intox. No bleeding, respiratory distress, pain, vomiting, or urinary/bowel incontinence noted.

## 2019-06-27 NOTE — ED ADULT NURSE REASSESSMENT NOTE - NSIMPLEMENTINTERV_GEN_ALL_ED
Implemented All Universal Safety Interventions:  [place] to call system. Call bell, personal items and telephone within reach. Instruct patient to call for assistance. Room bathroom lighting operational. Non-slip footwear when patient is off stretcher. Physically safe environment: no spills, clutter or unnecessary equipment. Stretcher in lowest position, wheels locked, appropriate side rails in place. Implemented All Universal Safety Interventions:  [place] to call system. Call bell, personal items and telephone within reach. Instruct patient to call for assistance. Room bathroom lighting operational. Non-slip footwear when patient is off stretcher. Physically safe environment: no spills, clutter or unnecessary equipment. Stretcher in lowest position, wheels locked, appropriate side rails in place. Implemented All Universal Safety Interventions:  [place] to call system. Call bell, personal items and telephone within reach. Instruct patient to call for assistance. Room bathroom lighting operational. Non-slip footwear when patient is off stretcher. Physically safe environment: no spills, clutter or unnecessary equipment. Stretcher in lowest position, wheels locked, appropriate side rails in place.

## 2019-06-27 NOTE — ED ADULT NURSE REASSESSMENT NOTE - NS ED NURSE REASSESS COMMENT FT1
Patient verbally abusive and threatening towards ED staff, security, and [employer]. Ambulates with steady gait, dc instructions provided and verbalizes understanding. Escorted out by [employer] Patient is AOx4, verbally abusive and threatening towards ED staff, security, and [employer]. Ambulates with steady gait, dc instructions provided and verbalizes understanding. Escorted out by [employer]

## 2019-06-27 NOTE — ED ADULT NURSE NOTE - OBJECTIVE STATEMENT
48y male BIBA presents to ED c/o AMS. Currently sleeping in stretcher nad, admits to heavy ETOH use. Denies elicit drug use, recent falls, head trauma, back pain, N/V, fevers, chills. No signs of obvious head injury, VSS.

## 2019-06-30 DIAGNOSIS — F10.129 ALCOHOL ABUSE WITH INTOXICATION, UNSPECIFIED: ICD-10-CM

## 2019-06-30 DIAGNOSIS — R41.82 ALTERED MENTAL STATUS, UNSPECIFIED: ICD-10-CM

## 2019-07-01 ENCOUNTER — EMERGENCY (EMERGENCY)
Facility: HOSPITAL | Age: 49
LOS: 1 days | Discharge: HOME | End: 2019-07-01
Attending: EMERGENCY MEDICINE | Admitting: EMERGENCY MEDICINE
Payer: MEDICAID

## 2019-07-01 VITALS — DIASTOLIC BLOOD PRESSURE: 70 MMHG | SYSTOLIC BLOOD PRESSURE: 117 MMHG | HEART RATE: 90 BPM

## 2019-07-01 VITALS
HEART RATE: 100 BPM | RESPIRATION RATE: 17 BRPM | DIASTOLIC BLOOD PRESSURE: 36 MMHG | TEMPERATURE: 98 F | SYSTOLIC BLOOD PRESSURE: 105 MMHG | OXYGEN SATURATION: 100 %

## 2019-07-01 DIAGNOSIS — R11.0 NAUSEA: ICD-10-CM

## 2019-07-01 DIAGNOSIS — F19.10 OTHER PSYCHOACTIVE SUBSTANCE ABUSE, UNCOMPLICATED: ICD-10-CM

## 2019-07-01 PROCEDURE — 99283 EMERGENCY DEPT VISIT LOW MDM: CPT

## 2019-07-01 RX ORDER — ONDANSETRON 8 MG/1
4 TABLET, FILM COATED ORAL ONCE
Refills: 0 | Status: COMPLETED | OUTPATIENT
Start: 2019-07-01 | End: 2019-07-01

## 2019-07-01 RX ADMIN — ONDANSETRON 4 MILLIGRAM(S): 8 TABLET, FILM COATED ORAL at 02:57

## 2019-07-01 NOTE — ED PROVIDER NOTE - NS ED MD EM SELECTION
32097 Exp Problem Focused - Low Complex 45241 Exp Problem Focused - Low Complex 30425 Exp Problem Focused - Low Complex

## 2019-08-01 NOTE — ED ADULT NURSE NOTE - CAS DISCH TRANSFER METHOD
61 y/o man with PMHx of extensive small cell lung cancer, metastatic to bone, and brain followed by Dr. Thao who presents with fever, chills, urinary incontinence and a dry cough, last chemotherapy was on Monday admitted for management of suspected sepsis. Private car

## 2020-03-18 ENCOUNTER — EMERGENCY (EMERGENCY)
Facility: HOSPITAL | Age: 50
LOS: 1 days | Discharge: HOME | End: 2020-03-18
Attending: EMERGENCY MEDICINE | Admitting: EMERGENCY MEDICINE
Payer: MEDICAID

## 2020-03-18 VITALS
HEART RATE: 90 BPM | WEIGHT: 149.91 LBS | TEMPERATURE: 98 F | DIASTOLIC BLOOD PRESSURE: 85 MMHG | SYSTOLIC BLOOD PRESSURE: 128 MMHG | RESPIRATION RATE: 16 BRPM | OXYGEN SATURATION: 98 % | HEIGHT: 67 IN

## 2020-03-18 DIAGNOSIS — F14.129 COCAINE ABUSE WITH INTOXICATION, UNSPECIFIED: ICD-10-CM

## 2020-03-18 DIAGNOSIS — F64.9 GENDER IDENTITY DISORDER, UNSPECIFIED: ICD-10-CM

## 2020-03-18 DIAGNOSIS — F10.129 ALCOHOL ABUSE WITH INTOXICATION, UNSPECIFIED: ICD-10-CM

## 2020-03-18 LAB
ETHANOL SERPL-MCNC: 216 MG/DL — HIGH
PCP SPEC-MCNC: SIGNIFICANT CHANGE UP

## 2020-03-18 PROCEDURE — 99220: CPT

## 2020-03-18 NOTE — ED PROVIDER NOTE - CLINICAL SUMMARY MEDICAL DECISION MAKING FREE TEXT BOX
alcohol intox brought in by EMS, unsteady gait, unsafe for discharge, transgender person, will place in CDU until sober.

## 2020-03-18 NOTE — ED CDU PROVIDER DISPOSITION NOTE - PATIENT PORTAL LINK FT
You can access the FollowMyHealth Patient Portal offered by [facility] by registering at the following website: [email or web address]. By joining [employer]’s FollowMyHealth portal, you will also be able to view your health information using other applications (apps) compatible with our system. You can access the FollowMyHealth Patient Portal offered by [facility] by registering at the following website: [email or web address]. By joining [employer]’s FollowMyHealth portal, you will also be able to view your health information using other applications (apps) compatible with our system. You can access the FollowMyHealth Patient Portal offered by [facility] by registering at the following website: [email or web address]. By joining [facility]’s FollowMyHealth portal, you will also be able to view your health information using other applications (apps) compatible with our system.

## 2020-03-18 NOTE — ED ADULT TRIAGE NOTE - CHIEF COMPLAINT QUOTE
Pt BIBEMS with complaint of alcohol intoxication from [facility]. Pt states "I'm intoxicated." Denies any injuries, no signs of trauma. + slurred speech, ambulates with steady gait.

## 2020-03-18 NOTE — ED ADULT NURSE NOTE - NSIMPLEMENTINTERV_GEN_ALL_ED
Implemented All Fall Risk Interventions:  [place] to call system. Call bell, personal items and telephone within reach. Instruct patient to call for assistance. Room bathroom lighting operational. Non-slip footwear when patient is off stretcher. Physically safe environment: no spills, clutter or unnecessary equipment. Stretcher in lowest position, wheels locked, appropriate side rails in place. Provide visual cue, wrist band, yellow gown, etc. Monitor gait and stability. Monitor for mental status changes and reorient to person, place, and time. Review medications for side effects contributing to fall risk. Reinforce activity limits and safety measures with patient and family. Implemented All Fall Risk Interventions:  [place] to call system. Call bell, personal items and telephone within reach. Instruct patient to call for assistance. Room bathroom lighting operational. Non-slip footwear when patient is off stretcher. Physically safe environment: no spills, clutter or unnecessary equipment. Stretcher in lowest position, wheels locked, appropriate side rails in place. Provide visual cue, wrist band, yellow gown, etc. Monitor gait and stability. Monitor for mental status changes and reorient to person, place, and time. Review medications for side effects contributing to fall risk. Reinforce activity limits and safety measures with patient and family. Implemented All Fall Risk Interventions:  [place] to call system. Call bell, personal items and telephone within reach. Instruct patient to call for assistance. Room bathroom lighting operational. Non-slip footwear when patient is off stretcher. Physically safe environment: no spills, clutter or unnecessary equipment. Stretcher in lowest position, wheels locked, appropriate side rails in place. Provide visual cue, wrist band, yellow gown, etc. Monitor gait and stability. Monitor for mental status changes and reorient to person, place, and time. Review medications for side effects contributing to fall risk. Reinforce activity limits and safety measures with patient and family.

## 2020-03-18 NOTE — ED CDU PROVIDER DISPOSITION NOTE - NSFOLLOWUPINSTRUCTIONS_ED_ALL_ED_FT
Please refrain from cocaine and alcohol.  Please call Patient Access to make a primary care appointment.

## 2020-03-18 NOTE — ED PROVIDER NOTE - NS ED MD DISPO DIVISION OBS
Formerly [facility], [facility]

## 2020-03-18 NOTE — ED ADULT NURSE NOTE - SUICIDE SCREENING QUESTION 3
Adrienne Mcintyre is a 18 year old woman who presents for complete exam and to establish care.    Current complaints include:  Studying abroad in [place] late August through December. May need an early refill of OCP around that time.  Has been on OCP for menorrhagia, working well. No spotting. No history of migraine with aura, DVT. No family history of breast cancer. Has 1 uncle who has had DVT.     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     She exercises regularly. She is a vegetarian and eats plenty of vegetables and limits junk foods.       No significant past medical history    Past Surgical History:   Procedure Laterality Date   • No past surgeries         Social History     Socioeconomic History   • Marital status: Not on file     Spouse name: Not on file   • Number of children: Not on file   • Years of education: Not on file   • Highest education level: Not on file   Occupational History   • Not on file   Social Needs   • Financial resource strain: Not on file   • Food insecurity:     Worry: Not on file     Inability: Not on file   • Transportation needs:     Medical: Not on file     Non-medical: Not on file   Tobacco Use   • Smoking status: Never Smoker   • Smokeless tobacco: Never Used   Substance and Sexual Activity   • Alcohol use: Never     Frequency: Never   • Drug use: Not on file   • Sexual activity: Not on file   Lifestyle   • Physical activity:     Days per week: Not on file     Minutes per session: Not on file   • Stress: Not on file   Relationships   • Social connections:     Talks on phone: Not on file     Gets together: Not on file     Attends Jehovah's witness service: Not on file     Active member of club or organization: Not on file     Attends meetings of clubs or organizations: Not on file     Relationship status: Not on file   • Intimate partner violence:     Fear of current or ex partner: Not on  file     Emotionally abused: Not on file     Physically abused: Not on file     Forced sexual activity: Not on file   Other Topics Concern   • Not on file   Social History Narrative    College student, studying nursing    Lives at home during the summer, dorm during the school year.       Family History   Problem Relation Age of Onset   • Depression Mother    • Hypertension Father    • Patient is unaware of any medical problems Sister    • Patient is unaware of any medical problems Brother    • Diabetes Maternal Grandmother    • Diabetes Paternal Grandmother    • Cancer Neg Hx    • Heart disease Neg Hx        Review of Systems:       Constitutional: Denies change in appetite, unintentional weight loss, or fevers.       Skin: No hair or skin complaints or changes.       No complaints regarding eyes, ears, nose, mouth or throat.       Respiratory: No shortness of breath, dyspnea on exertion or cough.       Cardiovascular: No chest pains, palpitations, or edema.       Gastrointestinal: No nausea, vomiting, diarrhea, constipation or blood in the stool.         Genitourinary: No dysuria or hematuria.       Menses: Regular without dysmenorrhea or spotting. No discharge or sores.       Neurologic: No numbness, weakness, or tingling. No headaches.       Musculoskeletal: No muscle or joint pain or stiffness.       Psychiatric: No anxiety or depression.    Exam:  General: No apparent distress.  Visit Vitals  /70 ([facility], Cuff Size: Regular)   Pulse 64   Resp 12   Ht 5' 4.5\" (1.638 m)   Wt 56.7 kg   LMP 03/22/2019 (Approximate)   BMI 21.12 kg/m²     Skin is unremarkable.  HEENT: Normocephalic/Atraumatic. Pupils equal, round and reactive. Extraocular muscles intact, anicteric, conjunctivae pink. Moist mucous membranes, oropharynx is clear. Nasal mucosa is within normal. Tympanic membranes and canals within normal.  Neck: No cervical or supraclavicular lymphadenopathy. No thyromegaly.   Cardiovascular: Regular  rate and rhythm. Normal S1 and S2. No S3, S4, or murmur. No jugular venous distension.   Respiratory: Clear to auscultation bilaterally, no wheeze.  Abdomen: Normoactive bowel sounds in all 4 quadrants. Soft, nontender, nondistended. No hepatosplenomegaly.  Back: Without scoliosis, costovertebral angle tenderness, or lumbar paraspinal tenderness.  Extremities: No clubbing or peripheral edema. 2+ pedal pulses bilaterally.  Psychiatric: Appropriate affect.  Neurologic: Sensation is intact to light touch throughout. Strength is 5/5 throughout. Reflexes are 2+ and symmetric throughout    Assessment/Plan:    1. Encounter for preventative care - discussed healthy diet and exercise. Contraception renewed, comment for need for early refill for study abroad added  2. School lab tests- hep B titer and TB test today. Letter given regarding vaccinations    rtc in about 1 year for physical or sooner prn     No

## 2020-03-18 NOTE — ED ADULT NURSE NOTE - OBJECTIVE STATEMENT
49y male presents to ED c/o ETOH intox. States "I'm intoxicated". Speaking full sentences, ambulating with steady gait, no obvious signs of trauma.

## 2020-08-21 NOTE — ED CDU PROVIDER DISPOSITION NOTE - DISCHARGE DATE
Initial /72 (BP Location: Left arm, Patient Position: Chair, Cuff Size: Adult Large)   Pulse 63   Temp 98.7  F (37.1  C) (Tympanic)   Wt 76.5 kg (168 lb 9.6 oz)   LMP 08/07/2020 (Within Days)   Breastfeeding No   BMI 32.93 kg/m   Estimated body mass index is 32.93 kg/m  as calculated from the following:    Height as of 6/15/20: 1.524 m (5').    Weight as of this encounter: 76.5 kg (168 lb 9.6 oz). .    Marti Mcduffie CMA (AAMA)     18-Mar-2020

## 2020-10-29 ENCOUNTER — EMERGENCY (EMERGENCY)
Facility: HOSPITAL | Age: 50
LOS: 1 days | Discharge: HOME | End: 2020-10-29
Admitting: EMERGENCY MEDICINE
Payer: MEDICAID

## 2020-10-29 VITALS
DIASTOLIC BLOOD PRESSURE: 122 MMHG | SYSTOLIC BLOOD PRESSURE: 166 MMHG | TEMPERATURE: 99 F | HEART RATE: 106 BPM | RESPIRATION RATE: 18 BRPM | OXYGEN SATURATION: 98 % | WEIGHT: 149.91 LBS | HEIGHT: 67 IN

## 2020-10-29 VITALS — DIASTOLIC BLOOD PRESSURE: 99 MMHG | SYSTOLIC BLOOD PRESSURE: 154 MMHG | HEART RATE: 100 BPM

## 2020-10-29 DIAGNOSIS — F15.10 OTHER STIMULANT ABUSE, UNCOMPLICATED: ICD-10-CM

## 2020-10-29 DIAGNOSIS — R41.82 ALTERED MENTAL STATUS, UNSPECIFIED: ICD-10-CM

## 2020-10-29 PROCEDURE — 99284 EMERGENCY DEPT VISIT MOD MDM: CPT

## 2020-10-29 RX ORDER — ACETAMINOPHEN 500 MG
650 TABLET ORAL ONCE
Refills: 0 | Status: COMPLETED | OUTPATIENT
Start: 2020-10-29 | End: 2020-10-29

## 2020-10-29 RX ADMIN — Medication 650 MILLIGRAM(S): at 08:30

## 2020-10-29 NOTE — ED ADULT TRIAGE NOTE - CHIEF COMPLAINT QUOTE
Pt walks in w/ EMS c/o AMS. Pt admits to meth and alcohol use and is now experiencing paranoia and auditory and visual hallucinations. Pt denies suicidal/homicidal ideations

## 2020-10-29 NOTE — ED PROVIDER NOTE - PATIENT PORTAL LINK FT
You can access the FollowMyHealth Patient Portal offered by [facility] by registering at the following website: [email or web address]. By joining [employer]’s FollowMyHealth portal, you will also be able to view your health information using other applications (apps) compatible with our system. You can access the FollowMyHealth Patient Portal offered by [facility] by registering at the following website: [email or web address]. By joining [employer]’s FollowMyHealth portal, you will also be able to view your health information using other applications (apps) compatible with our system. You can access the FollowMyHealth Patient Portal offered by [facility] by registering at the following website: [email or web address]. By joining [employer]’s FollowMyHealth portal, you will also be able to view your health information using other applications (apps) compatible with our system.

## 2020-10-29 NOTE — ED ADULT NURSE REASSESSMENT NOTE - NS ED NURSE REASSESS COMMENT FT1
Pt refused to sign dc paperwork, stated "I need to rest before I go to detox". Pt given metrocard, escorted out by security.

## 2020-10-29 NOTE — ED ADULT TRIAGE NOTE - NSTRIAGECARE_GEN_A_ER
H&P Exam - Trauma   Kesha Gerardo Lucio 60 y o  male MRN: [id number]  Unit/Bed#: 2 [place] [facility]/2 [place] 207-* Encounter: [id number]    Assessment/Plan   Trauma Alert: Trauma Acuity: Trauma Evaluation  Model of Arrival: Trauma Mode of Arrival: Direct from scene via Trauma Squad Name and Number: by [facility]  Trauma Team: Current Providers  Attending Provider: Juanito Dumont MD  Attending Provider: Sandra Addison MD  Registered Nurse: Brent Hutchison RN  Registered Nurse: Bossman Martinez RN  Registered Nurse: José Luis Post RN  Patient Care Assistant: Marty Daniel  Consultants:     Trauma Active Problems: chest pain    Trauma Plan: ct head, ct c/a/p, ecg and trop    Chief Complaint:   Chief Complaint   Patient presents with    Chest Injury     Patient reports a cow pushing a gate into him and reports being thrown approx  10 feet into a post   Reports right side rib pain and right forearm pain  Reports abrasion on left and right knee  c/o SOB       History of Present Illness   HPI:  Tor Duarte is a 60 y o  male who presents with chest pain, was thrown 10 feet after a cow hit a door that hit him  Unsure about head trauma but felt dazed  Feels sob  Not on any blood thinners  Last tetanus is unknown  No n/v/abd pain  Feels sob  Mechanism:Details of Incident: see triage note Injury Date: 06/06/18        HPI  Review of Systems   Constitutional: Negative for chills, diaphoresis and fever  Respiratory: Negative for cough, shortness of breath, wheezing and stridor  Cardiovascular: Positive for chest pain  Negative for palpitations and leg swelling  Gastrointestinal: Negative for abdominal pain, blood in stool, diarrhea, nausea and vomiting  Genitourinary: Negative for dysuria, frequency and urgency  Musculoskeletal: Negative for neck stiffness  Skin: Negative for pallor and rash  Neurological: Negative for dizziness, syncope, weakness, light-headedness and headaches     All other systems reviewed and are negative  Historical Information     Immunizations:   Immunization History   Administered Date(s) Administered    Tdap 06/06/2018       Past Medical History:   Diagnosis Date    Atrial fibrillation [facility])      History reviewed  No pertinent family history  Past Surgical History:   Procedure Laterality Date    HERNIA REPAIR         Social History     Social History    Marital status: /Civil Union     Spouse name: N/A    Number of children: N/A    Years of education: N/A     Social History Main Topics    Smoking status: Never Smoker    Smokeless tobacco: Never Used    Alcohol use No    Drug use: No    Sexual activity: Not Asked     Other Topics Concern    None     Social History Narrative    None       Family History: non-contributory    Meds/Allergies   None       No Known Allergies    PHYSICAL EXAM        Objective   Vitals:   First set: Temperature: 98 8 °F (37 1 °C) (06/06/18 2041)  Pulse: 57 (06/06/18 2041)  Respirations: 20 (06/06/18 2041)  Blood Pressure: 132/86 (06/06/18 2041)  SpO2: 97 % (06/06/18 2041)    Primary Survey:   (A) Airway: intact  (B) Breathing: equal b/l  (C) Circulation: Pulses:   normal  (D) Disabliity:  GCS Total:  15  (E) Expose:  Completed    Secondary Survey: (Click on Physical Exam tab above)  Physical Exam   Constitutional: He is oriented to person, place, and time  He appears well-developed and well-nourished  He appears distressed  HENT:   Head: Normocephalic and atraumatic  Eyes: Pupils are equal, round, and reactive to light  Neck: Neck supple  No c/t/l spine tenderness   Cardiovascular: Normal rate and regular rhythm  Pulmonary/Chest: Effort normal  No respiratory distress  He has no wheezes  Abdominal: Soft  Musculoskeletal: Normal range of motion  He exhibits no edema, tenderness or deformity  Neurological: He is alert and oriented to person, place, and time  No cranial nerve deficit  He exhibits normal muscle tone   Coordination normal  Skin: Skin is warm  No rash noted  He is not diaphoretic  No erythema  Bruise over the left shoulder and right lower back       Invasive Devices     Peripheral Intravenous Line            Peripheral IV 06/06/18 Left Antecubital less than 1 day                Lab Results:   Results Reviewed     Procedure Component Value Units Date/Time    POCT troponin [[id number]] Collected:  06/06/18 2105    Lab Status:  Final result Updated:  06/06/18 2118     POC Troponin I 0 01 ng/ml      Specimen Type VENOUS    Narrative:         [employer] i-Stat handheld analyzer 99% cutoff is > 0 08ng/mL in [facility]    o cTnI 99% cutoff is useful only when applied to patients in the clinical setting of myocardial ischemia  o cTnI 99% cutoff should be interpreted in the context of clinical history, ECG findings and possibly cardiac imaging to establish correct diagnosis  o cTnI 99% cutoff may be suggestive but clearly not indicative of a coronary event without the clinical setting of myocardial ischemia      POCT Chem 8+ [[id number]]  (Abnormal) Collected:  06/06/18 2101    Lab Status:  Final result Updated:  06/06/18 2118     SODIUM, I-STAT 139 mmol/l      Potassium, i-STAT 5 1 mmol/L      Chloride, istat 102 mmol/L      CO2, i-STAT 31 mmol/L      Anion Gap, Istat 13 mmol/L      Calcium, Ionized i-STAT 1 16 mmol/L      BUN, I-STAT 34 (H) mg/dl      Creatinine, i-STAT 0 8 mg/dl      eGFR 98 ml/min/1 73sq m      Glucose, i-STAT 102 mg/dl      Hct, i-STAT 47 %      Hgb, i-STAT 16 0 g/dl      Specimen Type VENOUS                 Imaging Studies:   XR chest 1 view portable   Final Result by Inés Shook MD (06/06 2149)      No acute abnormality in the chest             Workstation performed: [id number]         CT chest abdomen pelvis w contrast   Final Result by Noah Jorgensen DO (06/06 2137)      No evidence of acute traumatic injury               Workstation performed: [id number]         CT head without contrast   Final Result by Eric Cameron MD (06/06 2131)      Normal unenhanced CT of the head  Workstation performed: [id number]                 Code Status: Level 1 - Full Code  Advance Directive and Living Will:      Power of :    POLST:      Procedures  Procedures       Phone Contacts  ED Phone Contact     ED Course  ED Course as of Jun 07 0318 Wed Jun 06, 2018   2231 Waiting for  trauma doctor to call me back      Spoke with Dr Hanny Flaherty, recommend observation with RBBB on ecg which we cannot confirm to be old or new    ecg showed RBBB, no st or t wave changes concerning for ischemia, independently interpreted by me    MDM  CritCare Time    Disposition  Final diagnoses:   Trauma of chest, initial encounter     Time reflects when diagnosis was documented in both MDM as applicable and the Disposition within this note     Time User Action Codes Description Comment    6/6/2018 11:20 PM Lissa Felipe Add [S29  9XXA] Trauma of chest, initial encounter     6/6/2018 11:27 PM Patricia Lim Add [R94 31] Abnormal EKG     6/6/2018 11:27 PM Patricia Lim Modify [R94 31] Abnormal EKG       ED Disposition     ED Disposition Condition Comment    Admit  Case was discussed with medicine and the patient's admission status was agreed to be Admission Status: observation status to the service of Dr Pam Dorado   Follow-up Information    None       There are no discharge medications for this patient  No discharge procedures on file        ED Provider  Electronically Signed by         Zeny Lagunas MD  06/07/18 0039 blood glucose check/Face Mask

## 2020-10-29 NOTE — ED ADULT NURSE NOTE - OBJECTIVE STATEMENT
50y male presents c/o AMS. Also admits to ETOH use, ambulating with steady gait. 50y male presents c/o AMS. Also admits to ETOH use, ambulating with steady gait. +auditory hallucinations, denies HI, SI, t/v/a hallucination. 50y male presents c/o AMS. Also admits to ETOH use, ambulating with steady gait. +auditory hallucinations, denies HI, SI, no tactile hallucination.

## 2020-10-29 NOTE — ED PROVIDER NOTE - CLINICAL SUMMARY MEDICAL DECISION MAKING FREE TEXT BOX
pt presents today c/o some anxiety in the setting of using crystal meth. denies any trauma or other complaints. slightly tachy and hypertensive in the setting of anxiety with crystal meth but asymptomatic. will reassess. pt presents today c/o some anxiety in the setting of using crystal meth. denies any SI/HI/AVH, trauma or other complaints. slightly tachy and hypertensive in the setting of anxiety with crystal meth but asymptomatic. noted to be somewhat delusional but no apparent risk to self or others. will reassess.

## 2020-10-29 NOTE — ED ADULT NURSE NOTE - NSIMPLEMENTINTERV_GEN_ALL_ED
Implemented All Universal Safety Interventions:  [place] to call system. Call bell, personal items and telephone within reach. Instruct patient to call for assistance. Room bathroom lighting operational. Non-slip footwear when patient is off stretcher. Physically safe environment: no spills, clutter or unnecessary equipment. Stretcher in lowest position, wheels locked, appropriate side rails in place. Implemented All Universal Safety Interventions:  [place] to call system. Call bell, personal items and telephone within reach. Instruct patient to call for assistance. Room bathroom lighting operational. Non-slip footwear when patient is off stretcher. Physically safe environment: no spills, clutter or unnecessary equipment. Stretcher in lowest position, wheels locked, appropriate side rails in place. Implemented All Universal Safety Interventions:  [place] to call system. Call bell, personal items and telephone within reach. Instruct patient to call for assistance. Room bathroom lighting operational. Non-slip footwear when patient is off stretcher. Physically safe environment: no spills, clutter or unnecessary equipment. Stretcher in lowest position, wheels locked, appropriate side rails in place.

## 2020-10-29 NOTE — ED PROVIDER NOTE - OBJECTIVE STATEMENT
51yo M to F transgendered patient presents today feeling somewhat anxious after using crystal meth. denies any other complaints. denies any injuries or trauma. denies cp, sob, dizziness, vision changes, n/v/d, abd pain, any other concerns. 51yo M to F transgendered patient presents today feeling somewhat anxious after using crystal meth. denies any other complaints. denies any injuries or trauma. denies cp, sob, dizziness, vision changes, n/v/d, abd pain, any other concerns. denies SI/HI/AVH.

## 2020-10-29 NOTE — ED PROVIDER NOTE - PSYCHIATRIC, MLM
Alert and oriented to person, place, time/situation. somewhat anxious. no apparent risk to self or others. Alert and oriented to person, place, time/situation. somewhat anxious, delusional, speaking to herself, no apparent risk to self or others.

## 2021-02-21 ENCOUNTER — EMERGENCY (EMERGENCY)
Facility: HOSPITAL | Age: 51
LOS: 1 days | Discharge: HOME | End: 2021-02-21
Admitting: EMERGENCY MEDICINE
Payer: MEDICAID

## 2021-02-21 VITALS
DIASTOLIC BLOOD PRESSURE: 78 MMHG | RESPIRATION RATE: 18 BRPM | HEART RATE: 92 BPM | OXYGEN SATURATION: 99 % | SYSTOLIC BLOOD PRESSURE: 114 MMHG | TEMPERATURE: 98 F | WEIGHT: 160.06 LBS

## 2021-02-21 DIAGNOSIS — Z20.822 CONTACT WITH AND (SUSPECTED) EXPOSURE TO COVID-19: ICD-10-CM

## 2021-02-21 DIAGNOSIS — R56.9 UNSPECIFIED CONVULSIONS: ICD-10-CM

## 2021-02-21 LAB
ALBUMIN SERPL ELPH-MCNC: 4 G/DL — SIGNIFICANT CHANGE UP (ref 3.4–5)
ALP SERPL-CCNC: 83 U/L — SIGNIFICANT CHANGE UP (ref 40–120)
ALT FLD-CCNC: 38 U/L — SIGNIFICANT CHANGE UP (ref 12–42)
AMPHET UR-MCNC: NEGATIVE — SIGNIFICANT CHANGE UP
ANION GAP SERPL CALC-SCNC: 10 MMOL/L — SIGNIFICANT CHANGE UP (ref 9–16)
APPEARANCE UR: CLEAR — SIGNIFICANT CHANGE UP
APTT BLD: 28.8 SEC — SIGNIFICANT CHANGE UP (ref 27.5–35.5)
AST SERPL-CCNC: 29 U/L — SIGNIFICANT CHANGE UP (ref 15–37)
BACTERIA # UR AUTO: PRESENT /HPF
BARBITURATES UR SCN-MCNC: NEGATIVE — SIGNIFICANT CHANGE UP
BASOPHILS # BLD AUTO: 0.03 K/UL — SIGNIFICANT CHANGE UP (ref 0–0.2)
BASOPHILS NFR BLD AUTO: 0.6 % — SIGNIFICANT CHANGE UP (ref 0–2)
BENZODIAZ UR-MCNC: POSITIVE
BILIRUB SERPL-MCNC: 0.5 MG/DL — SIGNIFICANT CHANGE UP (ref 0.2–1.2)
BILIRUB UR-MCNC: NEGATIVE — SIGNIFICANT CHANGE UP
BUN SERPL-MCNC: 11 MG/DL — SIGNIFICANT CHANGE UP (ref 7–23)
CALCIUM SERPL-MCNC: 9.9 MG/DL — SIGNIFICANT CHANGE UP (ref 8.5–10.5)
CHLORIDE SERPL-SCNC: 100 MMOL/L — SIGNIFICANT CHANGE UP (ref 96–108)
CO2 SERPL-SCNC: 26 MMOL/L — SIGNIFICANT CHANGE UP (ref 22–31)
COCAINE METAB.OTHER UR-MCNC: NEGATIVE — SIGNIFICANT CHANGE UP
COLOR SPEC: YELLOW — SIGNIFICANT CHANGE UP
COMMENT - URINE: SIGNIFICANT CHANGE UP
CREAT SERPL-MCNC: 1.36 MG/DL — HIGH (ref 0.5–1.3)
DIFF PNL FLD: NEGATIVE — SIGNIFICANT CHANGE UP
EOSINOPHIL # BLD AUTO: 0.13 K/UL — SIGNIFICANT CHANGE UP (ref 0–0.5)
EOSINOPHIL NFR BLD AUTO: 2.5 % — SIGNIFICANT CHANGE UP (ref 0–6)
EPI CELLS # UR: SIGNIFICANT CHANGE UP /HPF (ref 0–5)
ETHANOL SERPL-MCNC: <3 MG/DL — SIGNIFICANT CHANGE UP
GLUCOSE SERPL-MCNC: 92 MG/DL — SIGNIFICANT CHANGE UP (ref 70–99)
GLUCOSE UR QL: NEGATIVE — SIGNIFICANT CHANGE UP
HCG SERPL-ACNC: <1 MIU/ML — SIGNIFICANT CHANGE UP
HCT VFR BLD CALC: 38 % — SIGNIFICANT CHANGE UP (ref 34.5–45)
HGB BLD-MCNC: 12.7 G/DL — SIGNIFICANT CHANGE UP (ref 11.5–15.5)
HYALINE CASTS # UR AUTO: ABNORMAL /LPF (ref 0–2)
IMM GRANULOCYTES NFR BLD AUTO: 0.9 % — SIGNIFICANT CHANGE UP (ref 0–1.5)
INR BLD: 1.07 — SIGNIFICANT CHANGE UP (ref 0.88–1.16)
KETONES UR-MCNC: NEGATIVE — SIGNIFICANT CHANGE UP
LACTATE SERPL-SCNC: 2.3 MMOL/L — HIGH (ref 0.4–2)
LEUKOCYTE ESTERASE UR-ACNC: NEGATIVE — SIGNIFICANT CHANGE UP
LIDOCAIN IGE QN: 88 U/L — SIGNIFICANT CHANGE UP (ref 73–393)
LYMPHOCYTES # BLD AUTO: 1.93 K/UL — SIGNIFICANT CHANGE UP (ref 1–3.3)
LYMPHOCYTES # BLD AUTO: 36.6 % — SIGNIFICANT CHANGE UP (ref 13–44)
MAGNESIUM SERPL-MCNC: 2.1 MG/DL — SIGNIFICANT CHANGE UP (ref 1.6–2.6)
MCHC RBC-ENTMCNC: 30.8 PG — SIGNIFICANT CHANGE UP (ref 27–34)
MCHC RBC-ENTMCNC: 33.4 GM/DL — SIGNIFICANT CHANGE UP (ref 32–36)
MCV RBC AUTO: 92 FL — SIGNIFICANT CHANGE UP (ref 80–100)
METHADONE UR-MCNC: NEGATIVE — SIGNIFICANT CHANGE UP
MONOCYTES # BLD AUTO: 0.64 K/UL — SIGNIFICANT CHANGE UP (ref 0–0.9)
MONOCYTES NFR BLD AUTO: 12.1 % — SIGNIFICANT CHANGE UP (ref 2–14)
NEUTROPHILS # BLD AUTO: 2.49 K/UL — SIGNIFICANT CHANGE UP (ref 1.8–7.4)
NEUTROPHILS NFR BLD AUTO: 47.3 % — SIGNIFICANT CHANGE UP (ref 43–77)
NITRITE UR-MCNC: NEGATIVE — SIGNIFICANT CHANGE UP
NRBC # BLD: 0 /100 WBCS — SIGNIFICANT CHANGE UP (ref 0–0)
NT-PROBNP SERPL-SCNC: 72 PG/ML — SIGNIFICANT CHANGE UP
OPIATES UR-MCNC: NEGATIVE — SIGNIFICANT CHANGE UP
PCP SPEC-MCNC: SIGNIFICANT CHANGE UP
PCP UR-MCNC: NEGATIVE — SIGNIFICANT CHANGE UP
PH UR: 6.5 — SIGNIFICANT CHANGE UP (ref 5–8)
PLATELET # BLD AUTO: 312 K/UL — SIGNIFICANT CHANGE UP (ref 150–400)
POTASSIUM SERPL-MCNC: 4 MMOL/L — SIGNIFICANT CHANGE UP (ref 3.5–5.3)
POTASSIUM SERPL-SCNC: 4 MMOL/L — SIGNIFICANT CHANGE UP (ref 3.5–5.3)
PROT SERPL-MCNC: 8 G/DL — SIGNIFICANT CHANGE UP (ref 6.4–8.2)
PROT UR-MCNC: 30 MG/DL
PROTHROM AB SERPL-ACNC: 12.6 SEC — SIGNIFICANT CHANGE UP (ref 10.6–13.6)
RBC # BLD: 4.13 M/UL — SIGNIFICANT CHANGE UP (ref 3.8–5.2)
RBC # FLD: 12.4 % — SIGNIFICANT CHANGE UP (ref 10.3–14.5)
RBC CASTS # UR COMP ASSIST: < 5 /HPF — SIGNIFICANT CHANGE UP
SARS-COV-2 RNA SPEC QL NAA+PROBE: DETECTED
SODIUM SERPL-SCNC: 136 MMOL/L — SIGNIFICANT CHANGE UP (ref 132–145)
SP GR SPEC: 1.02 — SIGNIFICANT CHANGE UP (ref 1–1.03)
THC UR QL: NEGATIVE — SIGNIFICANT CHANGE UP
TROPONIN I SERPL-MCNC: <0.017 NG/ML — LOW (ref 0.02–0.06)
UROBILINOGEN FLD QL: 0.2 E.U./DL — SIGNIFICANT CHANGE UP
WBC # BLD: 5.27 K/UL — SIGNIFICANT CHANGE UP (ref 3.8–10.5)
WBC # FLD AUTO: 5.27 K/UL — SIGNIFICANT CHANGE UP (ref 3.8–10.5)
WBC UR QL: < 5 /HPF — SIGNIFICANT CHANGE UP

## 2021-02-21 PROCEDURE — 71045 X-RAY EXAM CHEST 1 VIEW: CPT | Mod: 26

## 2021-02-21 PROCEDURE — 99285 EMERGENCY DEPT VISIT HI MDM: CPT | Mod: 25

## 2021-02-21 PROCEDURE — 93010 ELECTROCARDIOGRAM REPORT: CPT

## 2021-02-21 PROCEDURE — 70450 CT HEAD/BRAIN W/O DYE: CPT | Mod: 26

## 2021-02-21 NOTE — ED PROVIDER NOTE - PHYSICAL EXAMINATION
perrl, full EOMI, f-n normal, neg pronator, neg romberg, strength 5/5, normal gait, no dysmetria, no dysdiadochokinesia

## 2021-02-21 NOTE — ED ADULT TRIAGE NOTE - CHIEF COMPLAINT QUOTE
pt lynda from [facility] hotel for witnessed seizure. pt denies sz hx bgl on scene 98. poor historian possibly 2/2 postictal state. endorses estrogen home med. would like to be called joel but wishes to have name claudia on chart. pt lynda from [facility] hotel for witnessed seizure. pt denies sz hx bgl on scene 98. poor historian possibly 2/2 postictal state. endorses estrogen home med. would like to be called joel but wishes to have name claudia on chart. pt lynda from [facility] hotel for witnessed seizure. pt denies sz hx bgl on scene 98. poor historian possibly 2/2 postictal state. endorses estrogen home med. would like to be called joel but wishes to have name claudia on chart.

## 2021-02-21 NOTE — ED PROVIDER NOTE - CLINICAL SUMMARY MEDICAL DECISION MAKING FREE TEXT BOX
49 y/o transgender female here s/p seizure at shelter. Poor historian denies seizures. AOX3 exam WNL on arrival. Another hospital medical band on patients wrist unclear reason for visit.   Labs, Ct head, CXR, ekg

## 2021-02-21 NOTE — ED PROVIDER NOTE - PROVIDER TOKENS
PROVIDER:[TOKEN:[[id number]],FOLLOWUP:[1-3 Days]] PROVIDER:[TOKEN:[[id number]],FOLLOWUP:[1-3 Days]] PROVIDER:[TOKEN:[[id number]],FOLLOWUP:[1-3 Days]]

## 2021-02-21 NOTE — ED PROVIDER NOTE - PROGRESS NOTE DETAILS
While awaiting disposition patient became aggressive and dangerous with staff.  She would not into her isolation room.  She would not keep her mask on.  She started threatening ARIAN's family and using racial slurs.  Patient had to be escorted out by security.

## 2021-02-21 NOTE — ED PROVIDER NOTE - OBJECTIVE STATEMENT
49 y/o transgender female now here after [facility]. Patient arrives appearing well denying seizures. Per EMS patient had witnessed seizure in front of RN at shelter. Denies fever, chills, hematuria, abdominal pain, change in bowel function, flank pain, rash, HA, dizziness, SOB, CP, palpitations, diaphoresis, cough, and malaise. Patient states that she did not have a seizure and that the hotel would not let them in because she had COVID. Patient states she just hasn't been feeling well lately and has felt some nausea. Wishes to have some labs done and get tested for COVID. Recently d/c from [place]. Unclear reason for visit. Patient is poor historian vs post ictal. Appears well NAD. AOX 3 upon interview walking around ED. 49 y/o transgender female now here after [facility]. Patient arrives appearing well denying seizures. Per EMS patient had witnessed seizure in front of RN at shelter. Denies fever, chills, hematuria, abdominal pain, change in bowel function, flank pain, rash, HA, dizziness, SOB, CP, palpitations, diaphoresis, cough, and malaise. Patient states that she did not have a seizure and that the hotel would not let them in because she had COVID. Patient states she just hasn't been feeling well lately and has felt some nausea. Wishes to have some labs done and get tested for COVID. Recently d/c from [place]. Unclear reason for visit. Patient is poor historian vs post ictal. Appears well NAD. AOX 3 upon interview walking around ED. 49 y/o transgender female now here [facility]. Patient arrives appearing well denying seizures. Per EMS patient had witnessed seizure in front of RN at shelter. Denies fever, chills, hematuria, abdominal pain, change in bowel function, flank pain, rash, HA, dizziness, SOB, CP, palpitations, diaphoresis, cough, and malaise. Patient states that she did not have a seizure and that the hotel would not let them in because she had COVID. Patient states she just hasn't been feeling well lately and has felt some nausea. Wishes to have some labs done and get tested for COVID. Recently d/c from [place]. Unclear reason for visit. Patient is poor historian vs post ictal. Appears well NAD. AOX 3 upon interview walking around ED.

## 2021-02-21 NOTE — ED ADULT NURSE REASSESSMENT NOTE - NS ED NURSE REASSESS COMMENT FT1
Received Pt from previous RN lying on stretcher awake and alert, breathing without issue on RA, IV access is patent with no redness or swelling noted. Pt provided urine specimen, same sent to lab.

## 2021-02-21 NOTE — ED ADULT NURSE NOTE - OBJECTIVE STATEMENT
Pt BIB EMS for possible witnessed seizure by roommate at shelter, pt denies any hx of seizures or any other PMH, currently taking hormones, in no acute distress speaking in full sentences diane fontenot

## 2021-02-21 NOTE — ED ADULT NURSE NOTE - CHIEF COMPLAINT QUOTE
pt lynda from [facility] hotel for witnessed seizure. pt denies sz hx bgl on scene 98. poor historian possibly 2/2 postictal state. endorses estrogen home med. would like to be called joel but wishes to have name claudia on chart. pt lynda from [facility] hotel for witnessed seizure. pt denies sz hx bgl on scene 98. poor historian possibly 2/2 postictal state. endorses estrogen home med. would like to be called joel but wishes to have name claudia on chart. pt lynda from [facility] hotel for witnessed seizure. pt denies sz hx bgl on scene 98. poor historian possibly 2/2 postictal state. endorses estrogen home med. would like to be called joel but wishes to have name claudia on chart.

## 2021-02-21 NOTE — ED PROVIDER NOTE - CARE PROVIDER_API CALL
Kai Montes De Oca (MD)  Clinical Neurophysiology; EEGEpilepsy; Neurology; Sleep Medicine  [address], [address] [place] [address], [place] [address]  Phone: [phone]  Fax: [phone]  Follow Up Time: 1-3 Days   Kai Montes De Oca (MD)  Clinical Neurophysiology; EEGEpilepsy; Neurology; Sleep Medicine  [address], [address] [place] [address], [place] [address]  Phone: [phone]  Fax: [phone]  Follow Up Time: 1-3 Days   Kai Montes De Oca (MD)  Clinical Neurophysiology; EEGEpilepsy; Neurology; Sleep Medicine  [address], [address] [place] [address], [place] [address]  Phone: [phone]  Fax: [phone]  Follow Up Time: 1-3 Days

## 2021-02-21 NOTE — ED PROVIDER NOTE - PATIENT PORTAL LINK FT
You can access the FollowMyHealth Patient Portal offered by [facility] by registering at the following website: [email or web address]. By joining [employer]’s FollowMyHealth portal, you will also be able to view your health information using other applications (apps) compatible with our system. You can access the FollowMyHealth Patient Portal offered by [facility] by registering at the following website: [email or web address]. By joining [employer]’s FollowMyHealth portal, you will also be able to view your health information using other applications (apps) compatible with our system. You can access the FollowMyHealth Patient Portal offered by [facility] by registering at the following website: [email or web address]. By joining [employer]’s FollowMyHealth portal, you will also be able to view your health information using other applications (apps) compatible with our system.

## 2021-02-21 NOTE — ED PROVIDER NOTE - CARE PROVIDERS DIRECT ADDRESSES
[email or web address]

## 2021-02-21 NOTE — ED ADULT NURSE REASSESSMENT NOTE - NS ED NURSE REASSESS COMMENT FT1
Pt lying on stretcher, advised RN "Im ready to go back to my covid hotel". Charge and provider aware. Awaiting final dispo.

## 2021-02-21 NOTE — ED ADULT NURSE NOTE - NSIMPLEMENTINTERV_GEN_ALL_ED
Implemented All Universal Safety Interventions:  [place] to call system. Call bell, personal items and telephone within reach. Instruct patient to call for assistance. Room bathroom lighting operational. Non-slip footwear when patient is off stretcher. Physically safe environment: no spills, clutter or unnecessary equipment. Stretcher in lowest position, wheels locked, appropriate side rails in place. Implemented All Universal Safety Interventions:  [place] to call system. Call bell, personal items and telephone within reach. Instruct patient to call for assistance. Room bathroom lighting operational. Non-slip footwear when patient is off stretcher. Physically safe environment: no spills, clutter or unnecessary equipment. Stretcher in lowest position, wheels locked, appropriate side rails in place. Implemented All Universal Safety Interventions:  [place] to call system. Call bell, personal items and telephone within reach. Instruct patient to call for assistance. Room bathroom lighting operational. Non-slip footwear when patient is off stretcher. Physically safe environment: no spills, clutter or unnecessary equipment. Stretcher in lowest position, wheels locked, appropriate side rails in place.

## 2021-02-22 VITALS
DIASTOLIC BLOOD PRESSURE: 74 MMHG | SYSTOLIC BLOOD PRESSURE: 106 MMHG | TEMPERATURE: 98 F | HEART RATE: 89 BPM | RESPIRATION RATE: 18 BRPM | OXYGEN SATURATION: 100 %

## 2021-02-22 PROBLEM — Z00.00 ENCOUNTER FOR PREVENTIVE HEALTH EXAMINATION: Status: ACTIVE | Noted: 2021-02-22

## 2021-02-22 RX ORDER — ACETAMINOPHEN 500 MG
975 TABLET ORAL ONCE
Refills: 0 | Status: COMPLETED | OUTPATIENT
Start: 2021-02-22 | End: 2021-02-22

## 2021-02-22 RX ADMIN — Medication 975 MILLIGRAM(S): at 03:28

## 2021-02-22 NOTE — ED ADULT NURSE REASSESSMENT NOTE - NS ED NURSE REASSESS COMMENT FT1
Spoke to Latanya from [employer] ([phone]) and notified that pt is requesting COVID shelter placement. Isolation form emailed as per directions, awaiting review and placeemnt details at this time. Pt is aware that [facility] due to +C[facility], and will have to wait until AM. Verbalizes understanding. Spoke to Latanya from [facility] ([phone]) and notified that pt is requesting COVID shelter placement. Isolation form emailed as per directions, awaiting review and placeemnt details at this time. Pt is aware that [facility] due to +COVID results, and will have to wait until AM. Verbalizes understanding. Spoke to Latanya from [facility] ([phone]) and notified that pt is requesting COVID shelter placement. Isolation form emailed as per directions, awaiting review and placeemnt details at this time. Pt is aware that [facility] due to +COVID results, and will have to wait until AM. Verbalizes understanding.

## 2021-02-22 NOTE — ED ADULT NURSE REASSESSMENT NOTE - STATUS
awaiting placement at [facility] awaiting placement at [facility]

## 2021-02-23 LAB
CULTURE RESULTS: SIGNIFICANT CHANGE UP
SPECIMEN SOURCE: SIGNIFICANT CHANGE UP

## 2021-09-05 ENCOUNTER — EMERGENCY (EMERGENCY)
Facility: HOSPITAL | Age: 51
LOS: 1 days | Discharge: HOME | End: 2021-09-05
Attending: STUDENT IN AN ORGANIZED HEALTH CARE EDUCATION/TRAINING PROGRAM
Payer: MEDICAID

## 2021-09-05 VITALS
OXYGEN SATURATION: 97 % | TEMPERATURE: 99 F | DIASTOLIC BLOOD PRESSURE: 90 MMHG | SYSTOLIC BLOOD PRESSURE: 160 MMHG | RESPIRATION RATE: 19 BRPM | HEIGHT: 67 IN | HEART RATE: 117 BPM

## 2021-09-05 VITALS
SYSTOLIC BLOOD PRESSURE: 143 MMHG | HEART RATE: 106 BPM | DIASTOLIC BLOOD PRESSURE: 82 MMHG | OXYGEN SATURATION: 99 % | TEMPERATURE: 98 F | RESPIRATION RATE: 19 BRPM

## 2021-09-05 LAB
ALBUMIN SERPL ELPH-MCNC: 3.5 G/DL — SIGNIFICANT CHANGE UP (ref 3.5–5)
ALP SERPL-CCNC: 98 U/L — SIGNIFICANT CHANGE UP (ref 40–120)
ALT FLD-CCNC: 33 U/L DA — SIGNIFICANT CHANGE UP (ref 10–60)
ANION GAP SERPL CALC-SCNC: 11 MMOL/L — SIGNIFICANT CHANGE UP (ref 5–17)
AST SERPL-CCNC: 47 U/L — HIGH (ref 10–40)
BASOPHILS # BLD AUTO: 0.07 K/UL — SIGNIFICANT CHANGE UP (ref 0–0.2)
BASOPHILS NFR BLD AUTO: 0.6 % — SIGNIFICANT CHANGE UP (ref 0–2)
BILIRUB SERPL-MCNC: 0.8 MG/DL — SIGNIFICANT CHANGE UP (ref 0.2–1.2)
BUN SERPL-MCNC: 6 MG/DL — LOW (ref 7–18)
CALCIUM SERPL-MCNC: 8.6 MG/DL — SIGNIFICANT CHANGE UP (ref 8.4–10.5)
CHLORIDE SERPL-SCNC: 102 MMOL/L — SIGNIFICANT CHANGE UP (ref 96–108)
CO2 SERPL-SCNC: 23 MMOL/L — SIGNIFICANT CHANGE UP (ref 22–31)
CREAT SERPL-MCNC: 0.77 MG/DL — SIGNIFICANT CHANGE UP (ref 0.5–1.3)
EOSINOPHIL # BLD AUTO: 0.04 K/UL — SIGNIFICANT CHANGE UP (ref 0–0.5)
EOSINOPHIL NFR BLD AUTO: 0.3 % — SIGNIFICANT CHANGE UP (ref 0–6)
GLUCOSE SERPL-MCNC: 100 MG/DL — HIGH (ref 70–99)
HCT VFR BLD CALC: 35.2 % — LOW (ref 39–50)
HGB BLD-MCNC: 11.9 G/DL — LOW (ref 13–17)
IMM GRANULOCYTES NFR BLD AUTO: 0.4 % — SIGNIFICANT CHANGE UP (ref 0–1.5)
LYMPHOCYTES # BLD AUTO: 1.38 K/UL — SIGNIFICANT CHANGE UP (ref 1–3.3)
LYMPHOCYTES # BLD AUTO: 11.5 % — LOW (ref 13–44)
MCHC RBC-ENTMCNC: 30.5 PG — SIGNIFICANT CHANGE UP (ref 27–34)
MCHC RBC-ENTMCNC: 33.8 GM/DL — SIGNIFICANT CHANGE UP (ref 32–36)
MCV RBC AUTO: 90.3 FL — SIGNIFICANT CHANGE UP (ref 80–100)
MONOCYTES # BLD AUTO: 1.22 K/UL — HIGH (ref 0–0.9)
MONOCYTES NFR BLD AUTO: 10.1 % — SIGNIFICANT CHANGE UP (ref 2–14)
NEUTROPHILS # BLD AUTO: 9.26 K/UL — HIGH (ref 1.8–7.4)
NEUTROPHILS NFR BLD AUTO: 77.1 % — HIGH (ref 43–77)
NRBC # BLD: 0 /100 WBCS — SIGNIFICANT CHANGE UP (ref 0–0)
PLATELET # BLD AUTO: 299 K/UL — SIGNIFICANT CHANGE UP (ref 150–400)
POTASSIUM SERPL-MCNC: 3.8 MMOL/L — SIGNIFICANT CHANGE UP (ref 3.5–5.3)
POTASSIUM SERPL-SCNC: 3.8 MMOL/L — SIGNIFICANT CHANGE UP (ref 3.5–5.3)
PROT SERPL-MCNC: 7.9 G/DL — SIGNIFICANT CHANGE UP (ref 6–8.3)
RBC # BLD: 3.9 M/UL — LOW (ref 4.2–5.8)
RBC # FLD: 13.2 % — SIGNIFICANT CHANGE UP (ref 10.3–14.5)
SODIUM SERPL-SCNC: 136 MMOL/L — SIGNIFICANT CHANGE UP (ref 135–145)
WBC # BLD: 12.02 K/UL — HIGH (ref 3.8–10.5)
WBC # FLD AUTO: 12.02 K/UL — HIGH (ref 3.8–10.5)

## 2021-09-05 PROCEDURE — 80053 COMPREHEN METABOLIC PANEL: CPT

## 2021-09-05 PROCEDURE — 85025 COMPLETE CBC W/AUTO DIFF WBC: CPT

## 2021-09-05 PROCEDURE — 82962 GLUCOSE BLOOD TEST: CPT

## 2021-09-05 PROCEDURE — 93005 ELECTROCARDIOGRAM TRACING: CPT

## 2021-09-05 PROCEDURE — 99283 EMERGENCY DEPT VISIT LOW MDM: CPT

## 2021-09-05 PROCEDURE — 36415 COLL VENOUS BLD VENIPUNCTURE: CPT

## 2021-09-05 PROCEDURE — 99285 EMERGENCY DEPT VISIT HI MDM: CPT

## 2021-09-05 RX ORDER — SODIUM CHLORIDE 9 MG/ML
1000 INJECTION INTRAMUSCULAR; INTRAVENOUS; SUBCUTANEOUS ONCE
Refills: 0 | Status: COMPLETED | OUTPATIENT
Start: 2021-09-05 | End: 2021-09-05

## 2021-09-05 RX ADMIN — SODIUM CHLORIDE 2000 MILLILITER(S): 9 INJECTION INTRAMUSCULAR; INTRAVENOUS; SUBCUTANEOUS at 07:49

## 2021-09-05 NOTE — ED PROVIDER NOTE - OBJECTIVE STATEMENT
50-year-old male-to-female transgender (wants to be called Reta) BIBEMS for intoxication - found drunk at a gas station. Now states that she wants to go to detox. Patient is ambulating, AAOx3, wants to go home. Has no pain, shortness of breath, or any other symptoms.

## 2021-09-05 NOTE — ED PROVIDER NOTE - CLINICAL SUMMARY MEDICAL DECISION MAKING FREE TEXT BOX
50-year-old male to female trasngender, EtOH abuse, BIBEMS for intoxication. Able to ambulate, oriented x3, no signs of trauma. Tachycardia likely 2/2 dehdydration, will check labs, ECG, and hydrate. If vitals normalize can be discharged with detox information.

## 2021-09-05 NOTE — ED PROVIDER NOTE - PATIENT PORTAL LINK FT
You can access the FollowMyHealth Patient Portal offered by [facility] by registering at the following website: [email or web address]. By joining [employer]’s FollowMyHealth portal, you will also be able to view your health information using other applications (apps) compatible with our system. You can access the FollowMyHealth Patient Portal offered by [facility] by registering at the following website: [email or web address]. By joining [employer]’s FollowMyHealth portal, you will also be able to view your health information using other applications (apps) compatible with our system.

## 2021-09-05 NOTE — ED PROVIDER NOTE - CONSTITUTIONAL, MLM
disheveled, awake, alert, oriented to person, place, time/situation and in no apparent distress. normal...

## 2021-09-05 NOTE — ED PROVIDER NOTE - NSFOLLOWUPINSTRUCTIONS_ED_ALL_ED_FT
You were seen in the emergency department for: alcohol intoxication  See attached paper for information regarding detox.     You may be contacted by the Emergency Department Referrals Coordinator to set up your follow-up appointment within 24-48 hours of your discharge, Monday to Friday. We recommend you follow up with: your primary care doctor.     Please return to the Emergency Department if you experience any of the following symptoms:   - Shortness of breath or trouble breathing  - Pressure, pain or tightness in the chest  - Face drooping, arm weakness or speech difficulty  - Persistence of severe vomiting  - Head injury or loss of consciousness  - Nonstop bleeding or an open wound    (1) Follow up with your primary care physician within the next 24-48 hours as discussed. In addition, we did not find evidence of a life threatening illness on your testing here today, but listed below are the specialists that will be necessary to see as an outpatient to continue the workup.  Please call the numbers listed below or [phone] to set up the necessary appointments.  (2) Take Tylenol (up to 1000mg or 1 g)  and/or Motrin (up to 600mg) up to every 6 hours as needed for pain.   (3) If you had an IV (intravenous) line placed, it was removed. Sometimes, after IV removal, that area can be tender for a few days; if it develops redness and swelling, those could be signs of infection; in which case, return to the Emergency Department for assessment.  (4) Please continue taking all of your home medications as directed.

## 2021-12-07 NOTE — ED ADULT NURSE NOTE - FALL HARM RISK
other Ftsg Text: The defect edges were debeveled with a #15 scalpel blade.  Given the location of the defect, shape of the defect and the proximity to free margins a full thickness skin graft was deemed most appropriate.  Using a sterile surgical marker, the primary defect shape was transferred to the donor site. The area thus outlined was incised deep to adipose tissue with a #15 scalpel blade.  The harvested graft was then trimmed of adipose tissue until only dermis and epidermis was left.  The skin margins of the secondary defect were undermined to an appropriate distance in all directions utilizing iris scissors.  The secondary defect was closed with interrupted buried subcutaneous sutures.  The skin edges were then re-apposed with running  sutures.  The skin graft was then placed in the primary defect and oriented appropriately.

## 2022-04-25 NOTE — ED ADULT NURSE NOTE - NSIMPLEMENTINTERV_GEN_ALL_ED
no Implemented All Fall Risk Interventions:  [place] to call system. Call bell, personal items and telephone within reach. Instruct patient to call for assistance. Room bathroom lighting operational. Non-slip footwear when patient is off stretcher. Physically safe environment: no spills, clutter or unnecessary equipment. Stretcher in lowest position, wheels locked, appropriate side rails in place. Provide visual cue, wrist band, yellow gown, etc. Monitor gait and stability. Monitor for mental status changes and reorient to person, place, and time. Review medications for side effects contributing to fall risk. Reinforce activity limits and safety measures with patient and family. Implemented All Fall Risk Interventions:  [place] to call system. Call bell, personal items and telephone within reach. Instruct patient to call for assistance. Room bathroom lighting operational. Non-slip footwear when patient is off stretcher. Physically safe environment: no spills, clutter or unnecessary equipment. Stretcher in lowest position, wheels locked, appropriate side rails in place. Provide visual cue, wrist band, yellow gown, etc. Monitor gait and stability. Monitor for mental status changes and reorient to person, place, and time. Review medications for side effects contributing to fall risk. Reinforce activity limits and safety measures with patient and family. Implemented All Fall Risk Interventions:  [place] to call system. Call bell, personal items and telephone within reach. Instruct patient to call for assistance. Room bathroom lighting operational. Non-slip footwear when patient is off stretcher. Physically safe environment: no spills, clutter or unnecessary equipment. Stretcher in lowest position, wheels locked, appropriate side rails in place. Provide visual cue, wrist band, yellow gown, etc. Monitor gait and stability. Monitor for mental status changes and reorient to person, place, and time. Review medications for side effects contributing to fall risk. Reinforce activity limits and safety measures with patient and family.

## 2022-06-06 NOTE — ED ADULT TRIAGE NOTE - TEMPERATURE IN FAHRENHEIT (DEGREES F)
98.1 Azathioprine Counseling:  I discussed with the patient the risks of azathioprine including but not limited to myelosuppression, immunosuppression, hepatotoxicity, lymphoma, and infections.  The patient understands that monitoring is required including baseline LFTs, Creatinine, possible TPMP genotyping and weekly CBCs for the first month and then every 2 weeks thereafter.  The patient verbalized understanding of the proper use and possible adverse effects of azathioprine.  All of the patient's questions and concerns were addressed.

## 2022-10-17 NOTE — ED ADULT NURSE NOTE - RESPIRATORY ASSESSMENT
- - - Ketoconazole Counseling:   Patient counseled regarding improving absorption with orange juice.  Adverse effects include but are not limited to breast enlargement, headache, diarrhea, nausea, upset stomach, liver function test abnormalities, taste disturbance, and stomach pain.  There is a rare possibility of liver failure that can occur when taking ketoconazole. The patient understands that monitoring of LFTs may be required, especially at baseline. The patient verbalized understanding of the proper use and possible adverse effects of ketoconazole.  All of the patient's questions and concerns were addressed.

## 2022-11-03 NOTE — ED ADULT TRIAGE NOTE - BP NONINVASIVE DIASTOLIC (MM HG)
Please let patient know that the liver tests were essentially within normal limits. OK for her to start the Lamisil as previously sent in. Recheck LFTs after 1st month of tx and will send in next month if stable and no problems with it.    80

## 2023-01-17 NOTE — ED PROVIDER NOTE - NS ED MD DISPO DISCHARGE CCDA
Medical Necessity Clause: This procedure was medically necessary because the lesions that were treated were: Patient/Caregiver provided printed discharge information.

## 2023-02-23 NOTE — ED ADULT NURSE NOTE - NEURO ASSESSMENT
Airway  Performed by: Juan Manuel Schulz MD  Authorized by: Juan Manuel Schulz MD     Final Airway Type:  Endotracheal airway  Final Endotracheal Airway*:  ETT  ETT Size (mm)*:  7.5  Cuff*:  Regular  Technique Used for Successful ETT Placement:  Video laryngoscopy  Devices/Methods Used in Placement*:  Mask  Intubation Procedure*:  Preoxygenation, ETCO2, Atraumatic, Dentition Unchanged and Pharynx Clear  Insertion Site:  Oral  Blade Type*:  MAC  Blade Size*:  4  Measured from*:  Lips  Secured at (cm)*:  23  Placement Verified by: auscultation, capnometry and equal breath sounds    Glottic View*:  1 - full view of glottis  Attempts*:  1   Patient Identified, Procedure confirmed, Emergency equipment available and Safety protocols followed  Location:  OR  Urgency:  Elective  Difficult Airway: No    Indications for Airway Management:  Anesthesia  Spontaneous Ventilation: absent    Sedation Level:  Anesthetized  MILS Maintained Throughout: No    Mask Difficulty Assessment:  1 - vent by mask  Performed By:  Anesthesiologist  Anesthesiologist:  Juan Manuel Schulz MD  Start Time: 2/22/2023 7:49 PM   Atraumatic, dentition unchanged.      - - -

## 2023-08-10 NOTE — ED ADULT NURSE NOTE - NSSUHOSCREENINGYN_ED_ALL_ED
Addended by: RIO ROBLERO on: 8/10/2023 01:53 PM     Modules accepted: Orders     Yes - the patient is able to be screened

## 2023-08-30 NOTE — ED PROVIDER NOTE - HIV OFFER
Patient reports red irritated skin behind her right ear for the last 2-3 weeks.   Also notes area of redness to left wrist.
Previously Declined (within the last year)

## 2024-10-22 NOTE — ED PROVIDER NOTE - TEMPLATE, MLM
Patient has family history of diabetes and personal history of gestational diabetes with elevated BMI, counseled regarding diet and lifestyle modification changes, encouraged to reduce carbs and reduce portion sizes, exercise on regular basis with 30 to 40 minutes of cardio at least 3 times a week.  Will update labs and make recommendations pending result    General

## 2024-11-12 NOTE — ED ADULT NURSE NOTE - CAS TRG GEN SKIN CONDITION
How Severe Is It?: moderate
Is This A New Presentation, Or A Follow-Up?: Follow Up Discoid Lupus
Additional History: Patient is currently on Plaquenil 200 mg QOD. She has not needed to use topicals.
Warm/Dry

## 2024-12-20 NOTE — ED ADULT NURSE NOTE - CAS DISCH ACCOMP BY
Acute on chronic r lower back pain, was seen here 10/30 and 12/10 ,no bowel or bladder incontinence, completed steroids 3 days ago  
Self

## 2025-07-28 NOTE — ED PROVIDER NOTE - CCCP TRG CHIEF CMPLNT
Addended by: SOHAN MATHEWS on: 7/28/2025 03:49 PM     Modules accepted: Orders     alcohol intoxication